# Patient Record
Sex: FEMALE | Race: WHITE | NOT HISPANIC OR LATINO | URBAN - METROPOLITAN AREA
[De-identification: names, ages, dates, MRNs, and addresses within clinical notes are randomized per-mention and may not be internally consistent; named-entity substitution may affect disease eponyms.]

---
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
## 2020-02-22 ENCOUNTER — EMERGENCY (EMERGENCY)
Facility: HOSPITAL | Age: 20
LOS: 1 days | Discharge: HOME | End: 2020-02-22
Attending: EMERGENCY MEDICINE
Payer: MEDICAID

## 2020-02-22 VITALS
RESPIRATION RATE: 18 BRPM | TEMPERATURE: 98 F | WEIGHT: 205.03 LBS | HEART RATE: 130 BPM | DIASTOLIC BLOOD PRESSURE: 98 MMHG | HEIGHT: 66 IN | OXYGEN SATURATION: 98 % | SYSTOLIC BLOOD PRESSURE: 154 MMHG

## 2020-02-22 VITALS
TEMPERATURE: 99 F | RESPIRATION RATE: 20 BRPM | DIASTOLIC BLOOD PRESSURE: 59 MMHG | OXYGEN SATURATION: 98 % | HEART RATE: 113 BPM | SYSTOLIC BLOOD PRESSURE: 123 MMHG

## 2020-02-22 LAB
ALBUMIN SERPL ELPH-MCNC: 3.2 G/DL — LOW (ref 3.3–5)
ALP SERPL-CCNC: 258 U/L — HIGH (ref 40–120)
ALT FLD-CCNC: 71 U/L — HIGH (ref 10–45)
ANION GAP SERPL CALC-SCNC: 11 MMOL/L — SIGNIFICANT CHANGE UP (ref 5–17)
AST SERPL-CCNC: 128 U/L — HIGH (ref 10–40)
BASOPHILS # BLD AUTO: 0 K/UL — SIGNIFICANT CHANGE UP (ref 0–0.2)
BASOPHILS NFR BLD AUTO: 0 % — SIGNIFICANT CHANGE UP (ref 0–2)
BILIRUB SERPL-MCNC: 0.4 MG/DL — SIGNIFICANT CHANGE UP (ref 0.2–1.2)
BUN SERPL-MCNC: 10 MG/DL — SIGNIFICANT CHANGE UP (ref 7–23)
CALCIUM SERPL-MCNC: 8.6 MG/DL — SIGNIFICANT CHANGE UP (ref 8.4–10.5)
CHLORIDE SERPL-SCNC: 99 MMOL/L — SIGNIFICANT CHANGE UP (ref 96–108)
CO2 SERPL-SCNC: 21 MMOL/L — LOW (ref 22–31)
CREAT SERPL-MCNC: 0.39 MG/DL — LOW (ref 0.5–1.3)
EOSINOPHIL # BLD AUTO: 0 K/UL — SIGNIFICANT CHANGE UP (ref 0–0.5)
EOSINOPHIL NFR BLD AUTO: 0 % — SIGNIFICANT CHANGE UP (ref 0–6)
GLUCOSE SERPL-MCNC: 107 MG/DL — HIGH (ref 70–99)
HCT VFR BLD CALC: 41.8 % — SIGNIFICANT CHANGE UP (ref 34.5–45)
HGB BLD-MCNC: 12.5 G/DL — SIGNIFICANT CHANGE UP (ref 11.5–15.5)
LYMPHOCYTES # BLD AUTO: 3.53 K/UL — HIGH (ref 1–3.3)
LYMPHOCYTES # BLD AUTO: 50 % — HIGH (ref 13–44)
MCHC RBC-ENTMCNC: 21.6 PG — LOW (ref 27–34)
MCHC RBC-ENTMCNC: 29.9 GM/DL — LOW (ref 32–36)
MCV RBC AUTO: 72.1 FL — LOW (ref 80–100)
MONOCYTES # BLD AUTO: 0.78 K/UL — SIGNIFICANT CHANGE UP (ref 0–0.9)
MONOCYTES NFR BLD AUTO: 11 % — SIGNIFICANT CHANGE UP (ref 2–14)
NEUTROPHILS # BLD AUTO: 1.62 K/UL — LOW (ref 1.8–7.4)
NEUTROPHILS NFR BLD AUTO: 23 % — LOW (ref 43–77)
PLATELET # BLD AUTO: 167 K/UL — SIGNIFICANT CHANGE UP (ref 150–400)
POTASSIUM SERPL-MCNC: 4.7 MMOL/L — SIGNIFICANT CHANGE UP (ref 3.5–5.3)
POTASSIUM SERPL-SCNC: 4.7 MMOL/L — SIGNIFICANT CHANGE UP (ref 3.5–5.3)
PROT SERPL-MCNC: 7.4 G/DL — SIGNIFICANT CHANGE UP (ref 6–8.3)
RBC # BLD: 5.8 M/UL — HIGH (ref 3.8–5.2)
RBC # FLD: 14.2 % — SIGNIFICANT CHANGE UP (ref 10.3–14.5)
S PYO AG SPEC QL IA: NEGATIVE — SIGNIFICANT CHANGE UP
SODIUM SERPL-SCNC: 131 MMOL/L — LOW (ref 135–145)
T4 AB SER-ACNC: 24.1 UG/DL — HIGH (ref 4.6–12)
TSH SERPL-MCNC: <0.01 UIU/ML — LOW (ref 0.27–4.2)
WBC # BLD: 7.05 K/UL — SIGNIFICANT CHANGE UP (ref 3.8–10.5)
WBC # FLD AUTO: 7.05 K/UL — SIGNIFICANT CHANGE UP (ref 3.8–10.5)

## 2020-02-22 PROCEDURE — 99284 EMERGENCY DEPT VISIT MOD MDM: CPT | Mod: 25

## 2020-02-22 PROCEDURE — 84436 ASSAY OF TOTAL THYROXINE: CPT

## 2020-02-22 PROCEDURE — 99284 EMERGENCY DEPT VISIT MOD MDM: CPT

## 2020-02-22 PROCEDURE — 87880 STREP A ASSAY W/OPTIC: CPT

## 2020-02-22 PROCEDURE — 96374 THER/PROPH/DIAG INJ IV PUSH: CPT

## 2020-02-22 PROCEDURE — 80053 COMPREHEN METABOLIC PANEL: CPT

## 2020-02-22 PROCEDURE — 93005 ELECTROCARDIOGRAM TRACING: CPT

## 2020-02-22 PROCEDURE — 93010 ELECTROCARDIOGRAM REPORT: CPT

## 2020-02-22 PROCEDURE — 85060 BLOOD SMEAR INTERPRETATION: CPT

## 2020-02-22 PROCEDURE — 87081 CULTURE SCREEN ONLY: CPT

## 2020-02-22 PROCEDURE — 96372 THER/PROPH/DIAG INJ SC/IM: CPT | Mod: XU

## 2020-02-22 PROCEDURE — 85027 COMPLETE CBC AUTOMATED: CPT

## 2020-02-22 PROCEDURE — 84443 ASSAY THYROID STIM HORMONE: CPT

## 2020-02-22 RX ORDER — DEXAMETHASONE 0.5 MG/5ML
10 ELIXIR ORAL ONCE
Refills: 0 | Status: COMPLETED | OUTPATIENT
Start: 2020-02-22 | End: 2020-02-22

## 2020-02-22 RX ORDER — SODIUM CHLORIDE 9 MG/ML
1000 INJECTION INTRAMUSCULAR; INTRAVENOUS; SUBCUTANEOUS ONCE
Refills: 0 | Status: COMPLETED | OUTPATIENT
Start: 2020-02-22 | End: 2020-02-22

## 2020-02-22 RX ORDER — KETOROLAC TROMETHAMINE 30 MG/ML
15 SYRINGE (ML) INJECTION ONCE
Refills: 0 | Status: DISCONTINUED | OUTPATIENT
Start: 2020-02-22 | End: 2020-02-22

## 2020-02-22 RX ORDER — BENZOCAINE AND MENTHOL 5; 1 G/100ML; G/100ML
1 LIQUID ORAL ONCE
Refills: 0 | Status: COMPLETED | OUTPATIENT
Start: 2020-02-22 | End: 2020-02-22

## 2020-02-22 RX ADMIN — Medication 15 MILLIGRAM(S): at 04:33

## 2020-02-22 RX ADMIN — BENZOCAINE AND MENTHOL 1 LOZENGE: 5; 1 LIQUID ORAL at 03:20

## 2020-02-22 RX ADMIN — Medication 10 MILLIGRAM(S): at 03:21

## 2020-02-22 RX ADMIN — Medication 15 MILLIGRAM(S): at 03:21

## 2020-02-22 RX ADMIN — SODIUM CHLORIDE 1000 MILLILITER(S): 9 INJECTION INTRAMUSCULAR; INTRAVENOUS; SUBCUTANEOUS at 03:21

## 2020-02-22 NOTE — ED PROVIDER NOTE - OBJECTIVE STATEMENT
20 year-old female with history of hyperthyroidism (on methimazole), HTN, anemia (on Fe pills) presents to the Emergency Department for URI symptoms.  Reports cough (occasionally productive), nasal congestion and odynophagia ongoing for the past 5 days after positive sick contact (boyfriend).  Subjective fever.  Father had some left-over amoxicillin and give 3 days of 500mg Amoxicillin QD.  No nausea, vomiting, chest pain, shortness of breath, abdominal pain.  + diarrhea; history of IBS.  No BIS.  Patient reports baseline HR is in the 120s.

## 2020-02-22 NOTE — ED PROVIDER NOTE - ATTENDING CONTRIBUTION TO CARE
Leilani Baer MD - Attending Physician: I have personally seen and examined this patient with the resident/fellow.  I have fully participated in the care of this patient. I have reviewed all pertinent clinical information, including history, physical exam, plan and the Resident/Fellow’s note and agree except as noted. See MDM

## 2020-02-22 NOTE — ED PROVIDER NOTE - CLINICAL SUMMARY MEDICAL DECISION MAKING FREE TEXT BOX
20 year-old female with history of hyperthyroidism (on methimazole), HTN, anemia (on Fe pills) presents to the Emergency Department for URI symptoms; no fever.  Tachycardia likely baseline however; CBC to rule-out anemia.  Labs, IVF, pain control and likely d/c home. 20 year-old female with history of hyperthyroidism (on methimazole), HTN, anemia (on Fe pills) presents to the Emergency Department for URI symptoms; no fever.  Tachycardia likely baseline however; CBC to rule-out anemia.  Labs, IVF, pain control and likely d/c home.    Leilani Baer MD - Attending Physician: Pt here with URI symptoms x 5 days consistent with viral syndrome. Known baseline tachycardia. Well appearing, nonfocal exam. Tylenol/motrin, PO chall for dc

## 2020-02-22 NOTE — ED PROVIDER NOTE - PATIENT PORTAL LINK FT
You can access the FollowMyHealth Patient Portal offered by [facility] by registering at the following website: [email or web address]. By joining [facility]’s FollowMyHealth portal, you will also be able to view your health information using other applications (apps) compatible with our system.

## 2020-02-22 NOTE — ED PROVIDER NOTE - NSFOLLOWUPINSTRUCTIONS_ED_ALL_ED_FT
Follow-up with your Primary Care Physician within 24-48 hours.  Please return to the Emergency Department immediately for any new, worsening or concerning symptoms.  Copy of your lab work, imaging and/or other testing performed in the ER is attached along with your discharge paperwork.  Please take this to your Primary Care Physician for further discussion, evaluation and comparison with your prior blood work results.     Can use Tylenol (up to 1000mg every 6 hours) or Ibuprofen (up to 600mg every 6 hours) as per package directions for pain - use only as needed.  These are over-the-counter medications - please respect the warnings on the label.     Please follow-up with Gastroenterology for elevation in your liver enzymes. Follow-up with your Primary Care Physician within 24-48 hours.  Please return to the Emergency Department immediately for any new, worsening or concerning symptoms.  Copy of your lab work, imaging and/or other testing performed in the ER is attached along with your discharge paperwork.  Please take this to your Primary Care Physician for further discussion, evaluation and comparison with your prior blood work results.     Can use Tylenol (up to 1000mg every 6 hours) or Ibuprofen (up to 600mg every 6 hours) as per package directions for pain - use only as needed.  These are over-the-counter medications - please respect the warnings on the label.     You likely have Mono. Avoid all contact sports for 6 weeks. Please see your doctor for repeat labs to ensure the Liver enzymes and WBC improve once you are well.

## 2020-02-22 NOTE — ED PROVIDER NOTE - CARE PLAN
Principal Discharge DX:	Sore throat  Secondary Diagnosis:	URI, acute Principal Discharge DX:	Mononucleosis syndrome  Secondary Diagnosis:	URI, acute  Secondary Diagnosis:	Sore throat

## 2020-02-22 NOTE — ED PROVIDER NOTE - PROGRESS NOTE DETAILS
Marlon MONTANO: Patient's VS improved; labs w/o acute pathology.  Discussed transaminitis - gave GI follow-up. Marlon MONTANO: Patient's VS improved; labs w/o acute pathology.  Discussed transaminitis - gave GI follow-up.  Patient with likely mono; advised for no sports for 6 weeks. Leilani Baer MD - Attending Physician: Labs most consistent with likely Mono (pharyngitis without Strep, atypical reactive lymphs, mild transaminitis). Avoid contact sports. May have prolonged URI symptoms. Supportive care. F/u with PMD

## 2020-02-22 NOTE — ED PROVIDER NOTE - NSFOLLOWUPCLINICS_GEN_ALL_ED_FT
[facility]  Gastroenterology  [address] [place] [address]  Phone: [phone]  Fax:   Follow Up Time: 4-6 Days

## 2020-02-22 NOTE — ED ADULT NURSE NOTE - OBJECTIVE STATEMENT
20 year old female presents to ED via walk-in complaining of throat pain and cold exposure. PMH hyperthyroidism, HTN. Endorses boyfriend has bronchitis. Upon assessment A&O x4, ambulatory with steady gait and well appearing. Complains of throat pain x1 week, difficulty swallowing, and decreased PO intake. Airway patent. HR in 140s in main ED, placed on bedside cardiac monitor. Peripheral IV placed. Denies chest pain, SOB, n/v/d, fever and chills at this time. Bed locked and in lowest position. Family at bedside. Comfort and safety measures maintained. no

## 2020-02-22 NOTE — ED PROVIDER NOTE - PHYSICAL EXAMINATION
*Gen: NAD, AAO*3  *HEENT: NC/AT, dry mucous membranes, 2+ tonsils with exudate, airway patent, trachea midline  *CV: RRR, S1/S2 present, no murmurs  *Resp: no respiratory distress, LCTAB, no wheezing  *Abd: non-distended, soft N/Tx4, no guarding or rigidity  *Neuro: no focal neuro deficits, moving all limbs appropriately  *Extremities: no gross deformity  *Skin: no rashes, no wounds   ~ Maryam Mason M.D. *Gen: NAD, AAO*3  *HEENT: NC/AT, moist mucous membranes, 2+ tonsils with exudate, airway patent, trachea midline  *CV: Tachycardic, Reg Rhythm, S1/S2 present, no murmurs  *Resp: no respiratory distress, LCTAB, no wheezing  *Abd: non-distended, soft N/Tx4, no guarding or rigidity  *Neuro: no focal neuro deficits, moving all limbs appropriately  *Extremities: no gross deformity  *Skin: no rashes, no wounds   ~ Maryam Mason M.D.

## 2020-02-23 NOTE — ED POST DISCHARGE NOTE - DETAILS
known hyperthyroid,. lvm to call back admin line - Adelina Lindsay PA-C lvm to call back admin line. - Adelina Lindsay PA-C Third attempt. If no call back today will send letter. - Jamila Kelley PA-C Third attempt. No answer on pt number and VM full on alternate number. Will send letter. - Jamila Kelley PA-C

## 2020-03-11 ENCOUNTER — EMERGENCY (EMERGENCY)
Facility: HOSPITAL | Age: 20
LOS: 1 days | Discharge: HOME | End: 2020-03-11
Attending: EMERGENCY MEDICINE | Admitting: EMERGENCY MEDICINE
Payer: MEDICAID

## 2020-03-11 PROCEDURE — 99283 EMERGENCY DEPT VISIT LOW MDM: CPT

## 2020-03-12 VITALS
RESPIRATION RATE: 16 BRPM | OXYGEN SATURATION: 100 % | TEMPERATURE: 98 F | SYSTOLIC BLOOD PRESSURE: 150 MMHG | DIASTOLIC BLOOD PRESSURE: 79 MMHG | HEART RATE: 106 BPM

## 2020-03-12 VITALS
HEART RATE: 100 BPM | TEMPERATURE: 98 F | RESPIRATION RATE: 18 BRPM | OXYGEN SATURATION: 100 % | DIASTOLIC BLOOD PRESSURE: 71 MMHG | SYSTOLIC BLOOD PRESSURE: 138 MMHG

## 2020-03-12 DIAGNOSIS — R46.89 OTHER SYMPTOMS AND SIGNS INVOLVING APPEARANCE AND BEHAVIOR: ICD-10-CM

## 2020-03-12 DIAGNOSIS — F43.10 POST-TRAUMATIC STRESS DISORDER, UNSPECIFIED: ICD-10-CM

## 2020-03-12 DIAGNOSIS — F33.1 MAJOR DEPRESSIVE DISORDER, RECURRENT, MODERATE: ICD-10-CM

## 2020-03-12 LAB
ALBUMIN SERPL ELPH-MCNC: 3.8 G/DL — SIGNIFICANT CHANGE UP (ref 3.3–5)
ALP SERPL-CCNC: 130 U/L — HIGH (ref 40–120)
ALT FLD-CCNC: 20 U/L — SIGNIFICANT CHANGE UP (ref 4–33)
ANION GAP SERPL CALC-SCNC: 15 MMO/L — HIGH (ref 7–14)
APAP SERPL-MCNC: < 15 UG/ML — LOW (ref 15–25)
AST SERPL-CCNC: 25 U/L — SIGNIFICANT CHANGE UP (ref 4–32)
BASOPHILS # BLD AUTO: 0.02 K/UL — SIGNIFICANT CHANGE UP (ref 0–0.2)
BASOPHILS NFR BLD AUTO: 0.5 % — SIGNIFICANT CHANGE UP (ref 0–2)
BILIRUB SERPL-MCNC: 0.4 MG/DL — SIGNIFICANT CHANGE UP (ref 0.2–1.2)
BUN SERPL-MCNC: 9 MG/DL — SIGNIFICANT CHANGE UP (ref 7–23)
CALCIUM SERPL-MCNC: 9.1 MG/DL — SIGNIFICANT CHANGE UP (ref 8.4–10.5)
CHLORIDE SERPL-SCNC: 103 MMOL/L — SIGNIFICANT CHANGE UP (ref 98–107)
CO2 SERPL-SCNC: 19 MMOL/L — LOW (ref 22–31)
CREAT SERPL-MCNC: 0.46 MG/DL — LOW (ref 0.5–1.3)
EOSINOPHIL # BLD AUTO: 0.07 K/UL — SIGNIFICANT CHANGE UP (ref 0–0.5)
EOSINOPHIL NFR BLD AUTO: 1.7 % — SIGNIFICANT CHANGE UP (ref 0–6)
ETHANOL BLD-MCNC: < 10 MG/DL — SIGNIFICANT CHANGE UP
GLUCOSE SERPL-MCNC: 88 MG/DL — SIGNIFICANT CHANGE UP (ref 70–99)
HCG SERPL-ACNC: < 5 MIU/ML — SIGNIFICANT CHANGE UP
HCT VFR BLD CALC: 38.3 % — SIGNIFICANT CHANGE UP (ref 34.5–45)
HGB BLD-MCNC: 11.5 G/DL — SIGNIFICANT CHANGE UP (ref 11.5–15.5)
IMM GRANULOCYTES NFR BLD AUTO: 0.2 % — SIGNIFICANT CHANGE UP (ref 0–1.5)
LYMPHOCYTES # BLD AUTO: 2.31 K/UL — SIGNIFICANT CHANGE UP (ref 1–3.3)
LYMPHOCYTES # BLD AUTO: 56.6 % — HIGH (ref 13–44)
MCHC RBC-ENTMCNC: 22.2 PG — LOW (ref 27–34)
MCHC RBC-ENTMCNC: 30 % — LOW (ref 32–36)
MCV RBC AUTO: 73.9 FL — LOW (ref 80–100)
MONOCYTES # BLD AUTO: 0.54 K/UL — SIGNIFICANT CHANGE UP (ref 0–0.9)
MONOCYTES NFR BLD AUTO: 13.2 % — SIGNIFICANT CHANGE UP (ref 2–14)
NEUTROPHILS # BLD AUTO: 1.13 K/UL — LOW (ref 1.8–7.4)
NEUTROPHILS NFR BLD AUTO: 27.8 % — LOW (ref 43–77)
NRBC # FLD: 0 K/UL — SIGNIFICANT CHANGE UP (ref 0–0)
PLATELET # BLD AUTO: 211 K/UL — SIGNIFICANT CHANGE UP (ref 150–400)
PMV BLD: 12.2 FL — SIGNIFICANT CHANGE UP (ref 7–13)
POTASSIUM SERPL-MCNC: 4.2 MMOL/L — SIGNIFICANT CHANGE UP (ref 3.5–5.3)
POTASSIUM SERPL-SCNC: 4.2 MMOL/L — SIGNIFICANT CHANGE UP (ref 3.5–5.3)
PROT SERPL-MCNC: 7.1 G/DL — SIGNIFICANT CHANGE UP (ref 6–8.3)
RBC # BLD: 5.18 M/UL — SIGNIFICANT CHANGE UP (ref 3.8–5.2)
RBC # FLD: 15 % — HIGH (ref 10.3–14.5)
SALICYLATES SERPL-MCNC: < 5 MG/DL — LOW (ref 15–30)
SODIUM SERPL-SCNC: 137 MMOL/L — SIGNIFICANT CHANGE UP (ref 135–145)
T3 SERPL-MCNC: 651 NG/DL — HIGH (ref 80–200)
T4 AB SER-ACNC: 24.86 UG/DL — HIGH (ref 5.1–13)
TSH SERPL-MCNC: < 0.1 UIU/ML — LOW (ref 0.27–4.2)
WBC # BLD: 4.08 K/UL — SIGNIFICANT CHANGE UP (ref 3.8–10.5)
WBC # FLD AUTO: 4.08 K/UL — SIGNIFICANT CHANGE UP (ref 3.8–10.5)

## 2020-03-12 PROCEDURE — 90792 PSYCH DIAG EVAL W/MED SRVCS: CPT | Mod: GC

## 2020-03-12 NOTE — ED BEHAVIORAL HEALTH ASSESSMENT NOTE - DIFFERENTIAL
Major depressive Episode vs Adjustment disorder with disturbance of mood/conduct  Acute on chronic worsening of SI
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
## 2020-03-12 NOTE — ED ADULT NURSE NOTE - OBJECTIVE STATEMENT
I am having suicidal thoughts, I have passive thought everyday but today plans have started. I felt like I may act on them so I told my dad" patient follows up with [facility] outpatient. Reports noncompliance with medications and currently not in therapy, states "I have been on a waitlist for 5 months for a therapist". Pt is calm and cooperative. .  pt endorsing anxiety at this time. Denies AVH, paranoia. Endorses smoking MJ.

## 2020-03-12 NOTE — ED BEHAVIORAL HEALTH ASSESSMENT NOTE - SUICIDE RISK FACTORS
Mood Disorder current/past/Cluster B Personality disorders or traits current/past/Hopelessness or despair/PTSD current/past
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
## 2020-03-12 NOTE — ED BEHAVIORAL HEALTH NOTE - BEHAVIORAL HEALTH NOTE
ROLA called pt's father, Kanu, at [phone] to obtain collateral information.  There was no answer and voice mail was full; unable to leave a message at this time.  No family in waiting room at this time.
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
## 2020-03-12 NOTE — ED PROVIDER NOTE - OBJECTIVE STATEMENT
21 yo F with history of hyperthryroid on methizmazole 15 mg, HTN (atenolol 50 mg), depression but not taking her previous meds (lexapro 15 mg, risperdone dosage unknown, abilify 10 mg, prazosin 2 mg) that is being brought in tonight by father Ashutosh @ [phone] for suicidal ideation. pt reports that she has baseline depression and constant thoughts of suicide but something today made her consider suicide more with plans of hanging herself vs walking on to train tracks near her house. Pt has tried cutting herself in past. 5 months ago she was discharged from [facility] in psych after 5month stay as she finished a program but they released her and currently her dad is trying to get her back in to that program. Pt denies any recent illnesses otherwise. Non smoker, no drinking, no drugs. LMP 3 wks ago. No vaginal bleeding/discharge. 21 yo F with history of hyperthryroid on methizmazole 15 mg, HTN (atenolol 50 mg), depression but not taking her previous meds (lexapro 15 mg, risperdone dosage unknown, abilify 10 mg, prazosin 2 mg) that is being brought in tonight by father Ashutosh @ [phone] for suicidal ideation. pt reports that she has baseline depression and constant thoughts of suicide but something today made her consider suicide more with plans of hanging herself vs walking on to train tracks near her house. Pt has tried cutting herself in past. 5 months ago she was discharged from [facility] in psych after 5month stay as she finished a program but they released her and currently her dad is trying to get her back in to that program. Pt denies any recent illnesses otherwise and is not homicidal or having any hallucinations. Non smoker, no drinking, no drugs. LMP 3 wks ago. No vaginal bleeding/discharge.

## 2020-03-12 NOTE — ED ADULT TRIAGE NOTE - CHIEF COMPLAINT QUOTE
" I am having suicidal thoughts, I have passive thought everyday but today plans have started." pt has [facility] admissions prior for suicidal ideation, patient follows up with [facility] outpatient.  pt endorsing anxiety at this time. MD Krueger called for  clearance. " I am having suicidal thoughts, I have passive thought everyday but today plans have started. I felt like I may act on them so I told my dad" pt has [facility] admissions prior for suicidal ideation, patient follows up with [facility] outpatient.  pt endorsing anxiety at this time. MD Krueger called for  clearance.

## 2020-03-12 NOTE — ED PROVIDER NOTE - PROGRESS NOTE DETAILS
Pt labs shows hyperthryoidism which is already known and she is already on methimazole and atenolol. Has an endocrinologist that she follows with. Levels of T3 and T4 shows probably not adequately controlled but no S&S of thyroid storm or hyperthyroid state. Pending Psych clearance, will continue to monitor pending psych recs. MD Gardiner:  medically cleared by [facility] on prior shift.  Pending collateral from family.  Cleared by Psych.  Father coming to pick her up.

## 2020-03-12 NOTE — ED BEHAVIORAL HEALTH ASSESSMENT NOTE - MEDICATIONS (PRESCRIPTIONS, DIRECTIONS)
Continue to hold her home medications and patient will revisit starting them at her upcoming appointment

## 2020-03-12 NOTE — ED BEHAVIORAL HEALTH NOTE - BEHAVIORAL HEALTH NOTE
Writer called pt’s father Kanu  voicemail box is full and cannot leave a message.  Writer called pt’s phone  in case her father had her phone, but also goes to [employer].

## 2020-03-12 NOTE — ED BEHAVIORAL HEALTH ASSESSMENT NOTE - RISK ASSESSMENT
Patient represents a low-moderate acute suicide risk with numerous protective factors including ability to safety plan, no access to firearms, social supports, positive therapeutic relationships, future-oriented, goal-oriented, no substance use. Risk factors including recent SI with plan, hx of SA, hx of psych admissions, her underlying psychiatric symptoms, interpersonal stressors, impulsivity.     She remains elevated chronic risk given her history, but at this time is safe to discharge home (pending further discussion with her father) given upcoming psychiatric appointment and ability to complete Patient Safety Plan . Moderate Acute Suicide Risk

## 2020-03-12 NOTE — ED PROVIDER NOTE - CLINICAL SUMMARY MEDICAL DECISION MAKING FREE TEXT BOX
19 yo F with depression, hyperthyroid that is not taking her meds for depression that was previously hospitalized for SI that is being brought in tonight by father Ashutosh for SI. She wants to walk in front of train vs hang herself. Benign physical exam. Will place in psych/BH for eval. possible voluntary admission for SI. Denies HI/hallucinations. Will obtain labs to check on her thyroid.

## 2020-03-12 NOTE — ED PROVIDER NOTE - PATIENT PORTAL LINK FT
You can access the FollowMyHealth Patient Portal offered by [facility] by registering at the following website: [email or web address]. By joining [facility]’s FollowMyHealth portal, you will also be able to view your health information using other applications (apps) compatible with our system.

## 2020-03-12 NOTE — ED BEHAVIORAL HEALTH ASSESSMENT NOTE - ACTIVATING EVENTS/STRESSORS
Change in provider or treatment (i.e., medications, psychotherapy, milieu)/Non-compliant or not receiving treatment/Triggering events leading to humiliation, shame, and/or despair (e.g. Loss of relationship, financial or health status) (real or anticipated)

## 2020-03-12 NOTE — ED ADULT NURSE NOTE - CHIEF COMPLAINT QUOTE
" I am having suicidal thoughts, I have passive thought everyday but today plans have started. I felt like I may act on them so I told my dad" pt has [facility] admissions prior for suicidal ideation, patient follows up with [facility] outpatient.  pt endorsing anxiety at this time. MD Krueger called for  clearance.
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
## 2020-03-12 NOTE — ED BEHAVIORAL HEALTH ASSESSMENT NOTE - OTHER PAST PSYCHIATRIC HISTORY (INCLUDE DETAILS REGARDING ONSET, COURSE OF ILLNESS, INPATIENT/OUTPATIENT TREATMENT)
Hx of 3 past inpatient psychiatric hospitalization, hx of suicide attempt in 2012 by attempt to hang self (pt used belt and tied to shower curtain chikis; placed around neck for 1 minute but then stopped); denies hx of cutting or other self-injurious behavior.  Patient first started seeing a therapist around Sept. 2011 and stopped in 2013.  She first saw a psychiatrist in Sept. 2012 and stopped in Sept. 2015 because she thought she was feeling better and no longer needed Lexapro.  Patient first started medication (Zoloft) in Oct. 2012. Discharged from [facility] in Nov 2019. Currently sees Dr. Myles, has stopped her home psychiatric medications since January

## 2020-03-12 NOTE — ED BEHAVIORAL HEALTH NOTE - BEHAVIORAL HEALTH NOTE
Writer spoke to pt's father Kanu  briefly he stated he brought patient to the emergency room because of an ongoing situation with [facility].  He states she was in treatment at [facility] for 5 months and then they did not transition her to a therapist as promised.  She does have a psychiatrist, but not a therapist.  He states patient requested to go to the emergency room to talk to someone.  He states she told her, "I need to go to the hospital because I'm not having right thoughts".  He states she has a follow up appointment with the psychiatrist at [facility].  Pt's father doesn't have any safety concerns with patient returning home. Writer spoke to pt's father Kanu  briefly he stated he brought patient to the emergency room because of an ongoing situation with [facility].  He states she was in treatment at [facility] for 5 months and then they did not transition her to a therapist as promised.  She does have a psychiatrist, but not a therapist.  He states patient requested to go to the emergency room to talk to someone.  He states she told her, "I need to go to the hospital because I'm not having right thoughts".  He states she has a follow up appointment with the psychiatrist at [facility].  Pt's father doesn't have any safety concerns with patient returning home.  He will transport pt home.

## 2020-03-12 NOTE — ED BEHAVIORAL HEALTH ASSESSMENT NOTE - VIOLENCE PROTECTIVE FACTORS:
Residential stability/Insight into violence risk and need for management/treatment/Relationship stability/Sobriety/Engagement in treatment

## 2020-03-13 NOTE — ED BEHAVIORAL HEALTH NOTE - BEHAVIORAL HEALTH NOTE
High Risk Log follow up:  spoke to patient  states she is doing well and planning to see her psychiatrist today at 10:30am.

## 2021-09-23 ENCOUNTER — INPATIENT (INPATIENT)
Facility: HOSPITAL | Age: 21
LOS: 4 days | Discharge: PSYCHIATRIC OR SUBSTANCE USE FACILITY | End: 2021-09-28
Attending: HOSPITALIST | Admitting: HOSPITALIST
Payer: MEDICAID

## 2021-09-23 VITALS
DIASTOLIC BLOOD PRESSURE: 93 MMHG | TEMPERATURE: 99 F | SYSTOLIC BLOOD PRESSURE: 154 MMHG | OXYGEN SATURATION: 100 % | HEIGHT: 66 IN | RESPIRATION RATE: 18 BRPM | HEART RATE: 145 BPM

## 2021-09-23 DIAGNOSIS — F32.9 MAJOR DEPRESSIVE DISORDER, SINGLE EPISODE, UNSPECIFIED: ICD-10-CM

## 2021-09-23 DIAGNOSIS — R01.1 CARDIAC MURMUR, UNSPECIFIED: ICD-10-CM

## 2021-09-23 DIAGNOSIS — F43.10 POST-TRAUMATIC STRESS DISORDER, UNSPECIFIED: ICD-10-CM

## 2021-09-23 DIAGNOSIS — T42.6X1A POISONING BY OTHER ANTIEPILEPTIC AND SEDATIVE-HYPNOTIC DRUGS, ACCIDENTAL (UNINTENTIONAL), INITIAL ENCOUNTER: ICD-10-CM

## 2021-09-23 DIAGNOSIS — T42.6X2A POISONING BY OTHER ANTIEPILEPTIC AND SEDATIVE-HYPNOTIC DRUGS, INTENTIONAL SELF-HARM, INITIAL ENCOUNTER: ICD-10-CM

## 2021-09-23 DIAGNOSIS — F39 UNSPECIFIED MOOD [AFFECTIVE] DISORDER: ICD-10-CM

## 2021-09-23 DIAGNOSIS — E05.90 THYROTOXICOSIS, UNSPECIFIED WITHOUT THYROTOXIC CRISIS OR STORM: ICD-10-CM

## 2021-09-23 LAB
ALBUMIN SERPL ELPH-MCNC: 4 G/DL — SIGNIFICANT CHANGE UP (ref 3.3–5)
ALP SERPL-CCNC: 149 U/L — HIGH (ref 40–120)
ALT FLD-CCNC: 23 U/L — SIGNIFICANT CHANGE UP (ref 4–33)
ANION GAP SERPL CALC-SCNC: 15 MMOL/L — HIGH (ref 7–14)
APPEARANCE UR: CLEAR — SIGNIFICANT CHANGE UP
AST SERPL-CCNC: 68 U/L — HIGH (ref 4–32)
BACTERIA # UR AUTO: ABNORMAL
BASOPHILS # BLD AUTO: 0.06 K/UL — SIGNIFICANT CHANGE UP (ref 0–0.2)
BASOPHILS NFR BLD AUTO: 0.7 % — SIGNIFICANT CHANGE UP (ref 0–2)
BILIRUB SERPL-MCNC: 0.4 MG/DL — SIGNIFICANT CHANGE UP (ref 0.2–1.2)
BILIRUB UR-MCNC: NEGATIVE — SIGNIFICANT CHANGE UP
BLOOD GAS VENOUS COMPREHENSIVE RESULT: SIGNIFICANT CHANGE UP
BUN SERPL-MCNC: 10 MG/DL — SIGNIFICANT CHANGE UP (ref 7–23)
CALCIUM SERPL-MCNC: 9.6 MG/DL — SIGNIFICANT CHANGE UP (ref 8.4–10.5)
CHLORIDE SERPL-SCNC: 104 MMOL/L — SIGNIFICANT CHANGE UP (ref 98–107)
CO2 SERPL-SCNC: 18 MMOL/L — LOW (ref 22–31)
COLOR SPEC: YELLOW — SIGNIFICANT CHANGE UP
COVID-19 SPIKE DOMAIN AB INTERP: POSITIVE
COVID-19 SPIKE DOMAIN ANTIBODY RESULT: >250 U/ML — HIGH
CREAT SERPL-MCNC: 0.43 MG/DL — LOW (ref 0.5–1.3)
DIFF PNL FLD: ABNORMAL
EOSINOPHIL # BLD AUTO: 0.1 K/UL — SIGNIFICANT CHANGE UP (ref 0–0.5)
EOSINOPHIL NFR BLD AUTO: 1.2 % — SIGNIFICANT CHANGE UP (ref 0–6)
EPI CELLS # UR: 5 /HPF — SIGNIFICANT CHANGE UP (ref 0–5)
GLUCOSE SERPL-MCNC: 90 MG/DL — SIGNIFICANT CHANGE UP (ref 70–99)
GLUCOSE UR QL: NEGATIVE — SIGNIFICANT CHANGE UP
HCG SERPL-ACNC: <5 MIU/ML — SIGNIFICANT CHANGE UP
HCT VFR BLD CALC: 39.5 % — SIGNIFICANT CHANGE UP (ref 34.5–45)
HGB BLD-MCNC: 12.3 G/DL — SIGNIFICANT CHANGE UP (ref 11.5–15.5)
IANC: 5.22 K/UL — SIGNIFICANT CHANGE UP (ref 1.5–8.5)
IMM GRANULOCYTES NFR BLD AUTO: 0.2 % — SIGNIFICANT CHANGE UP (ref 0–1.5)
KETONES UR-MCNC: NEGATIVE — SIGNIFICANT CHANGE UP
LEUKOCYTE ESTERASE UR-ACNC: NEGATIVE — SIGNIFICANT CHANGE UP
LYMPHOCYTES # BLD AUTO: 2.38 K/UL — SIGNIFICANT CHANGE UP (ref 1–3.3)
LYMPHOCYTES # BLD AUTO: 28.1 % — SIGNIFICANT CHANGE UP (ref 13–44)
MAGNESIUM SERPL-MCNC: 1.5 MG/DL — LOW (ref 1.6–2.6)
MCHC RBC-ENTMCNC: 22.2 PG — LOW (ref 27–34)
MCHC RBC-ENTMCNC: 31.1 GM/DL — LOW (ref 32–36)
MCV RBC AUTO: 71.3 FL — LOW (ref 80–100)
MONOCYTES # BLD AUTO: 0.7 K/UL — SIGNIFICANT CHANGE UP (ref 0–0.9)
MONOCYTES NFR BLD AUTO: 8.3 % — SIGNIFICANT CHANGE UP (ref 2–14)
NEUTROPHILS # BLD AUTO: 5.22 K/UL — SIGNIFICANT CHANGE UP (ref 1.8–7.4)
NEUTROPHILS NFR BLD AUTO: 61.5 % — SIGNIFICANT CHANGE UP (ref 43–77)
NITRITE UR-MCNC: NEGATIVE — SIGNIFICANT CHANGE UP
NRBC # BLD: 0 /100 WBCS — SIGNIFICANT CHANGE UP
NRBC # FLD: 0 K/UL — SIGNIFICANT CHANGE UP
PCP SPEC-MCNC: SIGNIFICANT CHANGE UP
PH UR: 6 — SIGNIFICANT CHANGE UP (ref 5–8)
PHOSPHATE SERPL-MCNC: 3.2 MG/DL — SIGNIFICANT CHANGE UP (ref 2.5–4.5)
PLATELET # BLD AUTO: 155 K/UL — SIGNIFICANT CHANGE UP (ref 150–400)
POTASSIUM SERPL-MCNC: 4.3 MMOL/L — SIGNIFICANT CHANGE UP (ref 3.5–5.3)
POTASSIUM SERPL-SCNC: 4.3 MMOL/L — SIGNIFICANT CHANGE UP (ref 3.5–5.3)
PROT SERPL-MCNC: 7.5 G/DL — SIGNIFICANT CHANGE UP (ref 6–8.3)
PROT UR-MCNC: ABNORMAL
RBC # BLD: 5.54 M/UL — HIGH (ref 3.8–5.2)
RBC # FLD: 13.8 % — SIGNIFICANT CHANGE UP (ref 10.3–14.5)
RBC CASTS # UR COMP ASSIST: 4 /HPF — SIGNIFICANT CHANGE UP (ref 0–4)
SARS-COV-2 IGG+IGM SERPL QL IA: >250 U/ML — HIGH
SARS-COV-2 IGG+IGM SERPL QL IA: POSITIVE
SARS-COV-2 RNA SPEC QL NAA+PROBE: SIGNIFICANT CHANGE UP
SODIUM SERPL-SCNC: 137 MMOL/L — SIGNIFICANT CHANGE UP (ref 135–145)
SP GR SPEC: 1.03 — SIGNIFICANT CHANGE UP (ref 1–1.05)
TOXICOLOGY SCREEN, DRUGS OF ABUSE, SERUM RESULT: SIGNIFICANT CHANGE UP
TSH SERPL-MCNC: <0.1 UIU/ML — LOW (ref 0.27–4.2)
UROBILINOGEN FLD QL: SIGNIFICANT CHANGE UP
WBC # BLD: 8.48 K/UL — SIGNIFICANT CHANGE UP (ref 3.8–10.5)
WBC # FLD AUTO: 8.48 K/UL — SIGNIFICANT CHANGE UP (ref 3.8–10.5)
WBC UR QL: 3 /HPF — SIGNIFICANT CHANGE UP (ref 0–5)

## 2021-09-23 PROCEDURE — 99291 CRITICAL CARE FIRST HOUR: CPT

## 2021-09-23 PROCEDURE — 99223 1ST HOSP IP/OBS HIGH 75: CPT | Mod: GC

## 2021-09-23 RX ORDER — LANOLIN ALCOHOL/MO/W.PET/CERES
3 CREAM (GRAM) TOPICAL AT BEDTIME
Refills: 0 | Status: DISCONTINUED | OUTPATIENT
Start: 2021-09-23 | End: 2021-09-25

## 2021-09-23 RX ORDER — MAGNESIUM SULFATE 500 MG/ML
2 VIAL (ML) INJECTION ONCE
Refills: 0 | Status: COMPLETED | OUTPATIENT
Start: 2021-09-23 | End: 2021-09-23

## 2021-09-23 RX ORDER — ACETAMINOPHEN 500 MG
650 TABLET ORAL EVERY 6 HOURS
Refills: 0 | Status: DISCONTINUED | OUTPATIENT
Start: 2021-09-23 | End: 2021-09-28

## 2021-09-23 RX ORDER — INFLUENZA VIRUS VACCINE 15; 15; 15; 15 UG/.5ML; UG/.5ML; UG/.5ML; UG/.5ML
0.5 SUSPENSION INTRAMUSCULAR ONCE
Refills: 0 | Status: DISCONTINUED | OUTPATIENT
Start: 2021-09-23 | End: 2021-09-28

## 2021-09-23 RX ORDER — HYDROCORTISONE 20 MG
100 TABLET ORAL THREE TIMES A DAY
Refills: 0 | Status: DISCONTINUED | OUTPATIENT
Start: 2021-09-23 | End: 2021-09-23

## 2021-09-23 RX ORDER — NICOTINE POLACRILEX 2 MG
1 GUM BUCCAL DAILY
Refills: 0 | Status: DISCONTINUED | OUTPATIENT
Start: 2021-09-23 | End: 2021-09-28

## 2021-09-23 RX ORDER — ONDANSETRON 8 MG/1
4 TABLET, FILM COATED ORAL EVERY 8 HOURS
Refills: 0 | Status: DISCONTINUED | OUTPATIENT
Start: 2021-09-23 | End: 2021-09-28

## 2021-09-23 RX ORDER — SODIUM CHLORIDE 9 MG/ML
1000 INJECTION INTRAMUSCULAR; INTRAVENOUS; SUBCUTANEOUS
Refills: 0 | Status: DISCONTINUED | OUTPATIENT
Start: 2021-09-23 | End: 2021-09-28

## 2021-09-23 RX ORDER — SODIUM CHLORIDE 9 MG/ML
1000 INJECTION, SOLUTION INTRAVENOUS ONCE
Refills: 0 | Status: COMPLETED | OUTPATIENT
Start: 2021-09-23 | End: 2021-09-23

## 2021-09-23 RX ADMIN — SODIUM CHLORIDE 125 MILLILITER(S): 9 INJECTION INTRAMUSCULAR; INTRAVENOUS; SUBCUTANEOUS at 17:30

## 2021-09-23 RX ADMIN — Medication 1 PATCH: at 19:22

## 2021-09-23 RX ADMIN — Medication 50 GRAM(S): at 10:13

## 2021-09-23 RX ADMIN — Medication 1 PATCH: at 18:49

## 2021-09-23 RX ADMIN — SODIUM CHLORIDE 1000 MILLILITER(S): 9 INJECTION, SOLUTION INTRAVENOUS at 07:54

## 2021-09-23 NOTE — H&P ADULT - PROBLEM SELECTOR PLAN 1
Pt w/ nausea, dizziness, abd pain, tachycardia, tachypnea in setting of toxic ingestion.    Will provide supportive care as necessary. IV fluids, Zofran for nausea, and treat and symptoms.

## 2021-09-23 NOTE — H&P ADULT - NSHPREVIEWOFSYSTEMS_GEN_ALL_CORE
Constitutional/General- Denies acute distress.   Eyes- No changes in vision, double vision, blurry vision, does not wear glasses.  ENT- No nasal congestion or rhinorrhea,  changes in hearing, does not wear hearing aids. No odynophagia or dysphagia.   Skin-Denies rashes.  Cardiovascular- Denies chest pain or heart palpitations. No orthopnea/PND.  Pulmonary- Denies shortness of breath, no cough. No pleuritic chest pain or hemoptysis.   Gastrointestinal- + nausea and abd pain, Denies vomiting, diarrhea, bloating, constipation.   Genitourinary-Denies dysuria, frequency, or change in urine odor/appearance.   Musculoskeletal-Denies changes in strength, no joint tenderness or swelling.  Neurologic-Denies changes in memory. Denies headache. weakness, paresthesias, or imbalance.   Endocrine-Denies heat/cold intolerance, weight change, excessive sweating, or polydipsia/polyuria  Psychology-Denies changes in mood. Denies anxiety or depression.  Heme/Lymph-Denies easy bruising. Constitutional/General- Denies acute distress.   Eyes- No changes in vision, double vision, blurry vision, does not wear glasses.  ENT- No nasal congestion or rhinorrhea,  changes in hearing, does not wear hearing aids. No odynophagia or dysphagia.   Skin-Denies rashes.  Cardiovascular- Denies chest pain, +heart palpitations. No orthopnea/PND.  Pulmonary- Denies shortness of breath, no cough. No pleuritic chest pain or hemoptysis.   Gastrointestinal- +nausea and abd pain, Denies vomiting, diarrhea, bloating, constipation.   Genitourinary-Denies dysuria, frequency, or change in urine odor/appearance.   Musculoskeletal-Denies changes in strength, no joint tenderness or swelling.  Neurologic-Denies changes in memory. Denies headache. weakness, paresthesias, or imbalance. +Dizziness   Endocrine-Denies heat/cold intolerance, weight change, excessive sweating, or polydipsia/polyuria  Psychology-Denies changes in mood. +anxiety  Heme/Lymph-Denies easy bruising.

## 2021-09-23 NOTE — BH CONSULTATION LIAISON ASSESSMENT NOTE - NSBHCHARTREVIEWLAB_PSY_A_CORE FT
12.3   8.48  )-----------( 155      ( 23 Sep 2021 08:02 )             39.5   09-23    137  |  104  |  10  ----------------------------<  90  4.3   |  18<L>  |  0.43<L>    Ca    9.6      23 Sep 2021 08:02  Phos  3.2     09-23  Mg     1.50     09-23    TPro  7.5  /  Alb  4.0  /  TBili  0.4  /  DBili  x   /  AST  68<H>  /  ALT  23  /  AlkPhos  149<H>  09-23

## 2021-09-23 NOTE — H&P ADULT - ASSESSMENT
21F w/ hx of hyperthyroidism, depression, PTSD? admitted for supportive care in setting of gabapentin overdose, found to have possible thyrotoxicosis.  21F w/ hx of hyperthyroidism, depression, PTSD? admitted for supportive care in setting of intentional gabapentin overdose, found to have possible thyrotoxicosis.

## 2021-09-23 NOTE — CHART NOTE - NSCHARTNOTEFT_GEN_A_CORE
21F w/ hx of depression, PTSD?, hyperthyroidism presented to the ED after ingestion of 85-90 tablets of 300 mg gabapentin at around 4AM today. Endocrine consulted for hyperthyroidism.     Per team, pt is on methimazole at home (home dose unknown) but ran out of medication recently so hasn't taken for the past week.     TSH <0.1, TT4 19.6, TT3 564.     Alan-Wartofsky score 45 on arrival for fever, tachycardia with HR>140, agitation, highly suggestive of thyroid storm. Tachycardia now improved to , Temperature 37.2, c/w impending thyroid storm.    Recommend:  - add on STAT free T4  - Propanolol 60 mg q6h to titrate to goal normal HR (60-90) with holding parameters for BP   - telemetry monitoring   - Methimazole 20 mg q6h  - Hydrocortisone 100 mg q8h x 24 hrs    DW Team    Full consult tomorrow    Kerry Phan DO, Endocrine Fellow   Pager [phone] from 9-5PM. After hours and on weekends please call [phone]. 21F w/ hx of depression, PTSD?, hyperthyroidism presented to the ED after ingestion of 85-90 tablets of 300 mg gabapentin at around 4AM today. Endocrine consulted for hyperthyroidism.     Per team, pt is on methimazole at home (home dose unknown) but ran out of medication recently so hasn't taken for the past week.     TSH <0.1, TT4 19.6, TT3 564.     Alan-Wartofsky score 45 on arrival for fever, tachycardia with HR>140, agitation, highly suggestive of thyroid storm. Tachycardia now improved to  without BB, Temperature 37.2, score now 25 c/w impending thyroid storm.    Recommend:  - add on STAT free T4  - Propanolol q6-8 hr and titrate to goal normal HR (60-90) with holding parameters for BP and HR  - telemetry monitoring   - Methimazole 20 mg q6h for now    DW Team    Full consult tomorrow    Kerry Phan DO, Endocrine Fellow   Pager [phone] from 9-5PM. After hours and on weekends please call [phone].
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
## 2021-09-23 NOTE — BH CONSULTATION LIAISON ASSESSMENT NOTE - CURRENT MEDICATION
MEDICATIONS  (STANDING):    MEDICATIONS  (PRN):  acetaminophen   Tablet .. 650 milliGRAM(s) Oral every 6 hours PRN Temp greater or equal to 38.5C (101.3F), Mild Pain (1 - 3)  aluminum hydroxide/magnesium hydroxide/simethicone Suspension 30 milliLiter(s) Oral every 4 hours PRN Dyspepsia  melatonin 3 milliGRAM(s) Oral at bedtime PRN Insomnia  ondansetron Injectable 4 milliGRAM(s) IV Push every 8 hours PRN Nausea and/or Vomiting

## 2021-09-23 NOTE — BH CONSULTATION LIAISON ASSESSMENT NOTE - RISK ASSESSMENT
Risk: hx of SA, recent SA, current wavering SI, depression, anxiety, trauma  Protective: support from family, lives with father

## 2021-09-23 NOTE — ED ADULT NURSE REASSESSMENT NOTE - NS ED NURSE REASSESS COMMENT FT1
pt sleeping in bed at this time. arousable to noise, lethargic at this time. md presents to bedside for eval. pt remains sinus tachy on cardiac monitor, denies dizziness, lightheadedness, chest pain. axillary temp 100.5. md huizar made aware. will continue to monitor.

## 2021-09-23 NOTE — ED PROVIDER NOTE - ATTENDING CONTRIBUTION TO CARE
I performed the initial face to face bedside interview with this patient regarding history of present illness, review of symptoms and past medical, social and family history.  I completed an independent physical examination.  I was the initial provider who evaluated this patient.  I have signed out the follow up of any pending tests (i.e. labs, radiological studies) to the resident.  I have discussed the patient’s plan of care and disposition with the resident.     Upon my evaluation, this patient had a high probability of imminent or life-threatening deterioration due to gabapentin overdose, which required my direct attention, intervention, and personal management.  The patient has a  medical condition that impairs one or more vital organ systems.  Frequent personal assessment and adjustment of medical interventions was performed.      I have personally provided 45 minutes of critical care time exclusive of time spent on separately billable procedures. Time includes review of laboratory data, radiology results, discussion with consultants, patient and family; monitoring for potential decompensation, as well as time spent retrieving data and reviewing the chart and documenting the visit. Interventions were performed as documented above.

## 2021-09-23 NOTE — ED PROVIDER NOTE - IV ALTEPLASE ADMIN OUTSIDE HIDDEN
Abdominal Pain    Abdominal pain is pain in the stomach or belly area. Everyone has this pain from time to time. In many cases it goes away on its own. But abdominal pain can sometimes be due to a serious problem, such as appendicitis. So its important to know when to seek help.  Causes of abdominal pain  There are many possible causes of abdominal pain. Common causes in adults include:  · Constipation, diarrhea, or gas  · Stomach acid flowing back up into the esophagus (acid reflux or heartburn)  · Severe acid reflux, called GERD (gastroesophageal reflux disease)  · A sore in the lining of the stomach or small intestine (peptic ulcer)  · Inflammation of the gallbladder, liver, or pancreas  · Gallstones or kidney stones  · Appendicitis   · Intestinal blockage   · An internal organ pushing through a muscle or other tissue (hernia)  · Urinary tract infections  · In women, menstrual cramps, fibroids, or endometriosis  · Inflammation or infection of the intestines  Diagnosing the cause of abdominal pain  Your healthcare provider will do a physical exam help find the cause of your pain. If needed, tests will be ordered. Belly pain has many possible causes. So it can be hard to find the reason for your pain. Giving details about your pain can help. Tell your provider where and when you feel the pain, and what makes it better or worse. Also let your provider know if you have other symptoms such as:  · Fever  · Tiredness  · Upset stomach (nausea)  · Vomiting  · Changes in bathroom habits  Treating abdominal pain  Some causes of pain need emergency medical treatment right away. These include appendicitis or a bowel blockage. Other problems can be treated with rest, fluids, or medicines. Your healthcare provider can give you specific instructions for treatment or self-care based on what is causing your pain.  If you have vomiting or diarrhea, sip water or other clear fluids. When you are ready to eat solid foods again,  start with small amounts of easy-to-digest, low-fat foods. These include apple sauce, toast, or crackers.   When to seek medical care  Call 911 or go to the hospital right away if you:  · Cant pass stool and are vomiting  · Are vomiting blood or have bloody diarrhea or black, tarry diarrhea  · Have chest, neck, or shoulder pain  · Feel like you might pass out  · Have pain in your shoulder blades with nausea  · Have sudden, severe belly pain  · Have new, severe pain unlike any you have felt before  · Have a belly that is rigid, hard, and tender to touch  Call your healthcare provider if you have:  · Pain for more than 5 days  · Bloating for more than 2 days  · Diarrhea for more than 5 days  · A fever of 100.4°F (38.0°C) or higher, or as directed by your provider  · Pain that gets worse  · Weight loss for no reason  · Continued lack of appetite  · Blood in your stool  How to prevent abdominal pain  Here are some tips to help prevent abdominal pain:  · Eat smaller amounts of food at one time.  · Avoid greasy, fried, or other high-fat foods.  · Avoid foods that give you gas.  · Exercise regularly.  · Drink plenty of fluids.  To help prevent GERD symptoms:  · Quit smoking.  · Reduce alcohol and certain foods that increase stomach acid.  · Avoid aspirin and over-the-counter pain and fever medicines (NSAIDS or nonsteroidal anti-inflammatory drugs), if possible  · Lose extra weight.  · Finish eating at least 2 hours before you go to bed or lie down.  · Raise the head of your bed.  Date Last Reviewed: 7/1/2016  © 1735-7164 [employer]. [address] [place] [address], [place] [address]. All rights reserved. This information is not intended as a substitute for professional medical care. Always follow your healthcare professional's instructions.    If you have any questions, please call.  You can reach us at [phone] Monday through Thursday (except holidays) 8:30 a.m. to 5:30 p.m.     Note:  I will be out of the  office 7/25-7/29/19.  So if you have concerns or questions, please contact your primary care provider team or [facility] or go to the Urgent Care on Friday for concerns.     Thank you for using our Primary Care Service!    HAYDEE Venegas, CNP, JAMESP-BC  [facility]    To rate your experience with NAHUN Venegas, click on the link below:      [email or web address]         show

## 2021-09-23 NOTE — PATIENT PROFILE ADULT - 
ADDITIONAL INFORMATION
patient states she is fully vaccinated with pfizer covid19 vaccine. does not have her vaccine card with her. states first dose was last week of march and second dose was second week of april.

## 2021-09-23 NOTE — BH CONSULTATION LIAISON ASSESSMENT NOTE - DIFFERENTIAL
Major depressive Episode vs Adjustment disorder with disturbance of mood/conduct  Acute on chronic worsening of SI  PTSD

## 2021-09-23 NOTE — ED PROVIDER NOTE - OBJECTIVE STATEMENT
22 yo with history of depression presenting after an intentional overdose of about 85 tablets of 300mg Gabapentin that was prescribed to a family member.  Took them around 0400 this morning.  Started to feel abd pain generalized with some nausea as well.  Feeling dizzy.  Symptoms have been getting progressively worse.  +SI no HI AH VH.  Denies other ingestions and states took it with water and soda.

## 2021-09-23 NOTE — H&P ADULT - HISTORY OF PRESENT ILLNESS
21F w/ hx of depression, PTSD?, hyperthyroidism presented to the ED after ingestion of 85-90 tablets of 300 mg gabapentin at around 4AM today. States she took them from a family member prescribed them. States she has had stressors recently, including her father losing her job and her pending eviction from her family home. She presented to the ED with generalized abd pain, nausea, and dizziness. She states she has felt anxious and shaky as well, although she has felt these before due to her hyperthyroidism. States she has SI "on and off", denies HI, AH, VH. Denies any recent alcohol or drug use. She states she has been taking her methimazole 3x daily as prescribed but does not remember the dose.     Contact is her father Ashutosh: [phone].     In the ED:  Vital Signs Last 24 Hrs  T(F): 99 (23 Sep 2021 13:55), Max: 100.5 (23 Sep 2021 12:26)  HR: 109 (23 Sep 2021 13:55) (109 - 148)  BP: 142/84 (23 Sep 2021 13:55) (117/78 - 154/93)  RR: 18 (23 Sep 2021 13:55) (16 - 18)  SpO2: 99% (23 Sep 2021 13:55) (98% - 100%)    She received a 1L bolus LR, repletion of Mg, She was monitored on telemetry with no arrhythmia events. At bedside currently she endorses feelings of stress and anxiety. 21F w/ hx of depression, PTSD?, hyperthyroidism presented to the ED after ingestion of 85-90 tablets of 300 mg gabapentin at around 4AM today. States she took them from a family member prescribed them. States she has had stressors recently, including her father losing her job and her pending eviction from her family home. She presented to the ED with generalized abd pain, nausea, and dizziness. She states she has felt anxious and shaky as well, although she has felt these before due to her hyperthyroidism. States she has SI "on and off", denies HI, AH, VH. Denies any recent alcohol or drug use. She states she has been taking her methimazole 3x daily as prescribed but does not remember the dose. But as per father she has missed several doses because they lost the bottle.    Contact is her father Ashutosh: [phone].     In the ED:  Vital Signs Last 24 Hrs  T(F): 99 (23 Sep 2021 13:55), Max: 100.5 (23 Sep 2021 12:26)  HR: 109 (23 Sep 2021 13:55) (109 - 148)  BP: 142/84 (23 Sep 2021 13:55) (117/78 - 154/93)  RR: 18 (23 Sep 2021 13:55) (16 - 18)  SpO2: 99% (23 Sep 2021 13:55) (98% - 100%)    She received a 1L bolus LR, repletion of Mg, She was monitored on telemetry with no arrhythmia events. At bedside currently she endorses feelings of stress and anxiety.

## 2021-09-23 NOTE — ED ADULT NURSE NOTE - PATIENT ON (OXYGEN DELIVERY METHOD)
Received request via: Pharmacy    Was the patient seen in the last year in this department? Yes    Does the patient have an active prescription (recently filled or refills available) for medication(s) requested? No  
room air
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
## 2021-09-23 NOTE — H&P ADULT - NSHPLABSRESULTS_GEN_ALL_CORE
LABS:                        12.3   8.48  )-----------( 155      ( 23 Sep 2021 08:02 )             39.5         137  |  104  |  10  ----------------------------<  90  4.3   |  18<L>  |  0.43<L>    Ca    9.6      23 Sep 2021 08:02  Phos  3.2       Mg     1.50         TPro  7.5  /  Alb  4.0  /  TBili  0.4  /  DBili  x   /  AST  68<H>  /  ALT  23  /  AlkPhos  149<H>              Urinalysis Basic - ( 23 Sep 2021 08:48 )    Color: Yellow / Appearance: Clear / S.030 / pH: x  Gluc: x / Ketone: Negative  / Bili: Negative / Urobili: <2 mg/dL   Blood: x / Protein: Trace / Nitrite: Negative   Leuk Esterase: Negative / RBC: 4 /HPF / WBC 3 /HPF   Sq Epi: x / Non Sq Epi: 5 /HPF / Bacteria: Occasional        VBG  @ 08:02  pH: 7.41/pCO2: 35 /pO2: 64/HCO3: 22/lactate: 1.7 LABS:                        12.3   8.48  )-----------( 155      ( 23 Sep 2021 08:02 )             39.5         137  |  104  |  10  ----------------------------<  90  4.3   |  18<L>  |  0.43<L>    Ca    9.6      23 Sep 2021 08:02  Phos  3.2       Mg     1.50         TPro  7.5  /  Alb  4.0  /  TBili  0.4  /  DBili  x   /  AST  68<H>  /  ALT  23  /  AlkPhos  149<H>      EKG tracing reviewed and interpreted by me: Sinus tach                 Urinalysis Basic - ( 23 Sep 2021 08:48 )    Color: Yellow / Appearance: Clear / S.030 / pH: x  Gluc: x / Ketone: Negative  / Bili: Negative / Urobili: <2 mg/dL   Blood: x / Protein: Trace / Nitrite: Negative   Leuk Esterase: Negative / RBC: 4 /HPF / WBC 3 /HPF   Sq Epi: x / Non Sq Epi: 5 /HPF / Bacteria: Occasional        VBG  @ 08:02  pH: 7.41/pCO2: 35 /pO2: 64/HCO3: 22/lactate: 1.7

## 2021-09-23 NOTE — H&P ADULT - PROBLEM SELECTOR PLAN 3
Pt on seroquel at home. Anxious, teary, and withdrawn during interview. States she does not see a psychiatrist as an outpatient.     Psychiatry following.

## 2021-09-23 NOTE — H&P ADULT - PROBLEM SELECTOR PLAN 2
Elevated serum t4, t3, low serum TSH. Sinus tachycardia. Tremulous on exam.       Pt self-reports taking methimazole 3x a day as prescribed. Unsure of the dose. Will obtain home doses from father.  Will consider starting BB in setting of thyrotoxicosis.     Endo consulted. Elevated serum t4, t3, low serum TSH. Sinus tachycardia. Tremulous on exam.     Father states pt has not been taking methimazole for the last week or so bc they lost the bottle. States he is unsure of the dose. Will obtain home doses from father.  Will consider starting BB in setting of thyrotoxicosis.     Endo consulted.

## 2021-09-23 NOTE — ED PROVIDER NOTE - PHYSICAL EXAMINATION
Gen: Well appearing in NAD  Head: NC/AT  Eyes: PERRL  Neck: trachea midline  Resp:  No distress CTAB  CV: Tachy RR  Abd: soft TTP but distractible   Ext: no deformities  Neuro:  A&O  non focal  Skin:  Warm and dry as visualized  Psych:  +SI
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
## 2021-09-23 NOTE — ED ADULT NURSE REASSESSMENT NOTE - NS ED NURSE REASSESS COMMENT FT1
report taken from night RN. pt resting comfortably in bed at this time, denies complaints. denies current si/hi. remains on 1:1 with PCA at bedside. pt calm and cooperative at this time. answering questions appropriately at this time. pt in NAD. will continue to monitor.

## 2021-09-23 NOTE — H&P ADULT - ATTENDING COMMENTS
Depression w/ intentional neurontin overdose, supportive care for neurontin overdose, constant observation, Psych consult   Hyperthyroidism, resume Methimazole once dose confirmed, start Propranolol, monitor TFT's, Endo consulted   Tachycardia d/t thyrotoxicosis, start Propranolol, check TTE   Hypomagnesemia, supplement Mg

## 2021-09-23 NOTE — ED ADULT TRIAGE NOTE - CHIEF COMPLAINT QUOTE
Pt c/o dizziness, nausea. Reports "took 90 tablets of 300mg gabapentin around 4AM" to hurt herself.  PMHX hyperthyroidism, depression. 's EKG in progress. Pt c/o dizziness, nausea. Reports "took 90 tablets of 300mg gabapentin around 4AM" to hurt herself.  Arrives with empty pill bottle and reports she took the whole bottle. PMHX hyperthyroidism, depression. 's EKG in progress. Pt c/o dizziness, nausea. Reports "took 90 tablets of 300mg gabapentin around 4AM" to hurt herself.  Arrives with empty pill bottle and reports she took the whole bottle. PMHX hyperthyroidism, depression. 's. Taken to rm #5

## 2021-09-23 NOTE — ED ADULT NURSE NOTE - OBJECTIVE STATEMENT
Pt received to room 5 a/o x 3 x c/o dizziness, nausea headache, palpitations, SI, since 4 AM. pt states she took approx 90 tablets of her fathers 300mg of gabapentin. Pt had the empty bottle in her back and was last filled 9/21. Pt states she felt depressed and just took them. Pt states she has SI once before and never acted on it. Respirations even and unlabored. Lung sounds clear with equal chest rise bilaterally. ABD is soft, non tender, non distended with normal active bowel sounds. Pt denies  HI.  Pt denies visual or auditory hallucinations or other complaints. Pt belongings checked and secured by staff.  Pt changed into gown and scrubs. Pt on cardiac monitor in sinus tach. belongings placed in front of 21 and valuables sent down to security.

## 2021-09-23 NOTE — ED PROVIDER NOTE - PROGRESS NOTE DETAILS
Rafiq-PGY3: pt with intentional gabapentin overdose, approximately 90 capsules of 300 mg gabapentin. Reports ingestion at 0400, reports feeling remorse and not feeling well afterwards and states she brought herself to ED. Denies any other ingestions. Pt history of intentional overdose at age 12, states did not seek medical attention at that time. Pt reports increased stress recently. Daily meds include seroquel, zoloft, and methimazole, denies any extra doses of those medications. Pt noted to be tachycardic to 140s, hemodynamically stable. Spoke with toxicology fellow, states pt out of window for GI decon, recommending observation with supportive treatment. Rafiq-PGY3: pt persistently somnolent, unable to be cleared for psych interview. Discussed with hospitalist, accepted for admission. Text page was sent to the MAR to inform them of the patient's admission. My call back number was included for any follow up questions. Spoke to pt's father Kanu ([phone]) and updated on clinical status.

## 2021-09-23 NOTE — CONSULT NOTE ADULT - ASSESSMENT
·	Gabapentin overdoses are managed supportively.  ·	Observe the patient for 6 hours for resolution of symptoms.  ·	If patient remains hemodynamically stable, and there are no new active complaints then can be cleared.  ·	Agreed to involve psyche in course of care as intent was suicidal.     d/w Dr. Stephan Bajwa (Tox Attending of record)     Thank you for the consult  ·	Gabapentin overdoses are managed supportively.  ·	Patient will required monitoring until resolution of symptoms.  ·	Will continue to follow.     d/w Dr. Stephan Bajwa (Tox Attending of record)     Thank you for the consult

## 2021-09-23 NOTE — ED ADULT NURSE NOTE - CHIEF COMPLAINT QUOTE
Pt c/o dizziness, nausea. Reports "took 90 tablets of 300mg gabapentin around 4AM" to hurt herself.  Arrives with empty pill bottle and reports she took the whole bottle. PMHX hyperthyroidism, depression. 's. Taken to rm #5

## 2021-09-23 NOTE — BH CONSULTATION LIAISON ASSESSMENT NOTE - CASE SUMMARY
Chart reviewed. I agree with NP Jennifer's history, MSE, A/P with below additions. I personally saw the patient on 9/24/2021 in the AM. She remains dysthymic, depressed. States that she is embarrassed. Denies SI currently. Thought process is linear. Attention is good. Understands that she will need inpatient psychiatry.  Agree with plan per above.  Will follow with team.

## 2021-09-23 NOTE — H&P ADULT - NSHPSOCIALHISTORY_GEN_ALL_CORE
Lives with father at home. Does not work currently. She is getting her GED. States she is legally disabled secondary to PTSD.

## 2021-09-23 NOTE — BH CONSULTATION LIAISON ASSESSMENT NOTE - OTHER PAST PSYCHIATRIC HISTORY (INCLUDE DETAILS REGARDING ONSET, COURSE OF ILLNESS, INPATIENT/OUTPATIENT TREATMENT)
Hx of 3 past inpatient psychiatric hospitalization, hx of suicide attempt in 2012 by attempt to hang self (pt used belt and tied to shower curtain chikis; placed around neck for 1 minute but then stopped); denies hx of cutting or other self-injurious behavior.  Patient first started seeing a therapist around Sept. 2011 and stopped in 2013.  She first saw a psychiatrist in Sept. 2012 and stopped in Sept. 2015 because she thought she was feeling better and no longer needed Lexapro.  Patient first started medication (Zoloft) in Oct. 2012. Discharged from [facility] in Nov 2019. most recently saw Dr. Myles, but stopped her home psychiatric medications since January 2020.  has not been in treatment since march 2020.

## 2021-09-23 NOTE — BH CONSULTATION LIAISON ASSESSMENT NOTE - NSBHCHARTREVIEWVS_PSY_A_CORE FT
Vital Signs Last 24 Hrs  T(C): 37.2 (23 Sep 2021 13:55), Max: 38.1 (23 Sep 2021 12:26)  T(F): 99 (23 Sep 2021 13:55), Max: 100.5 (23 Sep 2021 12:26)  HR: 109 (23 Sep 2021 13:55) (109 - 148)  BP: 142/84 (23 Sep 2021 13:55) (117/78 - 154/93)  BP(mean): --  RR: 18 (23 Sep 2021 13:55) (16 - 18)  SpO2: 99% (23 Sep 2021 13:55) (98% - 100%)

## 2021-09-23 NOTE — ED PROVIDER NOTE - CLINICAL SUMMARY MEDICAL DECISION MAKING FREE TEXT BOX
22 yo with intentional gabapentin overdose.  Currently mentating well.  Biggest concern is CNS depression.  Need to consider co-ingestions so will get ASA ETOH APAP.  EKG without significant prolongations or abnormalities aside from rate.  Discussed with tox and symptomatic treatment,  The patient is out of window for charcoal.  Recommending a 6 hour obs period.  Will dispo once 6 hours either to med if pt sedated or remains tachy or to  for intentional ingestion and SI.
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
## 2021-09-23 NOTE — H&P ADULT - NSHPPHYSICALEXAM_GEN_ALL_CORE
VS:T:BP:HR: RR: SpO2 (100% on room air) Ht: Wt: BMI:    General: Alert and oriented to name, place, and date. Tremulous. Teary.   HEENT: EOMI, PERRLA, mid-dilated pupils. +conj injection. +bilateral proptosis.  no nasal discharge, no sinus congestion ,MMM, no oral lesion.  Neck: Supple, no appreciable JVP.   Lungs: Good inspiratory effort, clear to auscultation bilaterally.   Heart: +tachycardic. Regular rhythm. no murmurs/rubs/gallops.   Abdomen: Soft, mild diffuse tenderness to palpation. non-distended, normal bowel sounds, no Hepatosplenomegaly (HSM), no suprapubic tenderness.   Back: Exam limited 2/2 patient discomfort, but no spinal or paraspinal tenderness. No Costrovertebral angle tenderness (CVAT).   Extremities: No clubbing, cyanosis, or edema. Pulses 2+ in all extremities.   Skin: No rash, ecchymosis, petechiae, or nodules.  Neuro: CN II-XII intact. Normal tone.  Strength 5/5 in b/l upper and lower extremities. Sensation to light touch and cold temperature intact throughout. Reflexes 2+ in upper and lower extremities. Coordination intact. VS:T:BP:HR: RR: SpO2 (100% on room air) Ht: Wt: BMI:    General: Alert and oriented to name, place, and date. Tremulous. Teary.   HEENT: EOMI, PERRLA, mid-dilated pupils. +conj injection. +bilateral proptosis.  no nasal discharge, no sinus congestion ,MMM, no oral lesion.  Neck: Supple, no appreciable JVP.   Lungs: Good inspiratory effort, clear to auscultation bilaterally.   Heart: +tachycardic. Regular rhythm. Gr III LUSB murmur   Abdomen: Soft, mild diffuse tenderness to palpation. non-distended, normal bowel sounds, no Hepatosplenomegaly (HSM), no suprapubic tenderness.   Back: Exam limited 2/2 patient discomfort, but no spinal or paraspinal tenderness. No Costrovertebral angle tenderness (CVAT).   Extremities: No clubbing, cyanosis, or edema. Pulses 2+ in all extremities.   Skin: No rash, ecchymosis, petechiae, or nodules.  Neuro: CN II-XII intact. Normal tone.  Strength 5/5 in b/l upper and lower extremities. Sensation to light touch and cold temperature intact throughout. Reflexes 2+ in upper and lower extremities. Coordination intact. Vital Signs Last 24 Hrs  T(C): 37.2 (23 Sep 2021 13:55), Max: 38.1 (23 Sep 2021 12:26)  T(F): 99 (23 Sep 2021 13:55), Max: 100.5 (23 Sep 2021 12:26)  HR: 109 (23 Sep 2021 13:55) (109 - 148)  BP: 142/84 (23 Sep 2021 13:55) (117/78 - 154/93)  BP(mean): --  RR: 18 (23 Sep 2021 13:55) (16 - 18)  SpO2: 99% (23 Sep 2021 13:55) (98% - 100%)    General: Alert and oriented to name, place, and date. Tremulous. Teary.   HEENT: EOMI, PERRLA, mid-dilated pupils. +conj injection. +bilateral proptosis.  no nasal discharge, no sinus congestion ,MMM, no oral lesion.  Neck: Supple, no appreciable JVP.   Lungs: Good inspiratory effort, clear to auscultation bilaterally.   Heart: +tachycardic. Regular rhythm. Gr III LUSB murmur   Abdomen: Soft, mild diffuse tenderness to palpation. non-distended, normal bowel sounds, no Hepatosplenomegaly (HSM), no suprapubic tenderness.   Back: Exam limited 2/2 patient discomfort, but no spinal or paraspinal tenderness. No Costrovertebral angle tenderness (CVAT).   Extremities: No clubbing, cyanosis, or edema. Pulses 2+ in all extremities.   Skin: No rash, ecchymosis, petechiae, or nodules.  Neuro: CN II-XII intact. Normal tone.  Strength 5/5 in b/l upper and lower extremities. Sensation to light touch and cold temperature intact throughout. Reflexes 2+ in upper and lower extremities. Coordination intact.

## 2021-09-24 DIAGNOSIS — E05.00 THYROTOXICOSIS WITH DIFFUSE GOITER WITHOUT THYROTOXIC CRISIS OR STORM: ICD-10-CM

## 2021-09-24 LAB
ALBUMIN SERPL ELPH-MCNC: 3.7 G/DL — SIGNIFICANT CHANGE UP (ref 3.3–5)
ALP SERPL-CCNC: 114 U/L — SIGNIFICANT CHANGE UP (ref 40–120)
ALT FLD-CCNC: 19 U/L — SIGNIFICANT CHANGE UP (ref 4–33)
ANION GAP SERPL CALC-SCNC: 9 MMOL/L — SIGNIFICANT CHANGE UP (ref 7–14)
AST SERPL-CCNC: 26 U/L — SIGNIFICANT CHANGE UP (ref 4–32)
BILIRUB SERPL-MCNC: 0.4 MG/DL — SIGNIFICANT CHANGE UP (ref 0.2–1.2)
BUN SERPL-MCNC: 10 MG/DL — SIGNIFICANT CHANGE UP (ref 7–23)
CALCIUM SERPL-MCNC: 9.2 MG/DL — SIGNIFICANT CHANGE UP (ref 8.4–10.5)
CHLORIDE SERPL-SCNC: 109 MMOL/L — HIGH (ref 98–107)
CO2 SERPL-SCNC: 22 MMOL/L — SIGNIFICANT CHANGE UP (ref 22–31)
CREAT SERPL-MCNC: 0.36 MG/DL — LOW (ref 0.5–1.3)
GLUCOSE SERPL-MCNC: 80 MG/DL — SIGNIFICANT CHANGE UP (ref 70–99)
HCT VFR BLD CALC: 34.6 % — SIGNIFICANT CHANGE UP (ref 34.5–45)
HGB BLD-MCNC: 10.7 G/DL — LOW (ref 11.5–15.5)
MAGNESIUM SERPL-MCNC: 1.7 MG/DL — SIGNIFICANT CHANGE UP (ref 1.6–2.6)
MCHC RBC-ENTMCNC: 22.2 PG — LOW (ref 27–34)
MCHC RBC-ENTMCNC: 30.9 GM/DL — LOW (ref 32–36)
MCV RBC AUTO: 71.8 FL — LOW (ref 80–100)
NRBC # BLD: 0 /100 WBCS — SIGNIFICANT CHANGE UP
NRBC # FLD: 0 K/UL — SIGNIFICANT CHANGE UP
PHOSPHATE SERPL-MCNC: 3 MG/DL — SIGNIFICANT CHANGE UP (ref 2.5–4.5)
PLATELET # BLD AUTO: 156 K/UL — SIGNIFICANT CHANGE UP (ref 150–400)
POTASSIUM SERPL-MCNC: 4 MMOL/L — SIGNIFICANT CHANGE UP (ref 3.5–5.3)
POTASSIUM SERPL-SCNC: 4 MMOL/L — SIGNIFICANT CHANGE UP (ref 3.5–5.3)
PROT SERPL-MCNC: 6.4 G/DL — SIGNIFICANT CHANGE UP (ref 6–8.3)
RBC # BLD: 4.82 M/UL — SIGNIFICANT CHANGE UP (ref 3.8–5.2)
RBC # FLD: 14 % — SIGNIFICANT CHANGE UP (ref 10.3–14.5)
SODIUM SERPL-SCNC: 140 MMOL/L — SIGNIFICANT CHANGE UP (ref 135–145)
WBC # BLD: 6.09 K/UL — SIGNIFICANT CHANGE UP (ref 3.8–10.5)
WBC # FLD AUTO: 6.09 K/UL — SIGNIFICANT CHANGE UP (ref 3.8–10.5)

## 2021-09-24 PROCEDURE — 99232 SBSQ HOSP IP/OBS MODERATE 35: CPT | Mod: GC

## 2021-09-24 PROCEDURE — 90792 PSYCH DIAG EVAL W/MED SRVCS: CPT

## 2021-09-24 PROCEDURE — 99222 1ST HOSP IP/OBS MODERATE 55: CPT | Mod: GC

## 2021-09-24 PROCEDURE — 99254 IP/OBS CNSLTJ NEW/EST MOD 60: CPT | Mod: GC

## 2021-09-24 RX ORDER — METOPROLOL TARTRATE 50 MG
50 TABLET ORAL
Refills: 0 | Status: DISCONTINUED | OUTPATIENT
Start: 2021-09-24 | End: 2021-09-28

## 2021-09-24 RX ADMIN — Medication 3 MILLIGRAM(S): at 01:23

## 2021-09-24 RX ADMIN — Medication 650 MILLIGRAM(S): at 00:30

## 2021-09-24 RX ADMIN — SODIUM CHLORIDE 125 MILLILITER(S): 9 INJECTION INTRAMUSCULAR; INTRAVENOUS; SUBCUTANEOUS at 00:28

## 2021-09-24 RX ADMIN — Medication 650 MILLIGRAM(S): at 00:00

## 2021-09-24 RX ADMIN — Medication 1 PATCH: at 17:16

## 2021-09-24 RX ADMIN — Medication 1 PATCH: at 19:22

## 2021-09-24 RX ADMIN — Medication 1 PATCH: at 17:00

## 2021-09-24 RX ADMIN — Medication 50 MILLIGRAM(S): at 21:52

## 2021-09-24 RX ADMIN — Medication 1 PATCH: at 06:29

## 2021-09-24 NOTE — CONSULT NOTE ADULT - SUBJECTIVE AND OBJECTIVE BOX
MEDICAL TOXICOLOGY CONSULT    HPI: 21 Yr old female k/c hyperthyroidism, depression with intentional OD on ~ 90 pills of Gabapentin 300 mg each at 4 AM. Complaints of nausea, dizziness, abdominal pain. Denies co-ingestions.   Patient has been upset lately, lives with her father and has a limited social [place].     ONSET / TIME of exposure(s): 4 AM    QUANTITY of exposure(s):  ~ 90 pills of Gabapentin 300 mg each    ROUTE of exposure: Ingestion    CONTEXT of exposure: Home    ASSOCIATED symptoms: nausea, dizziness, abdominal pain.    MEDICATION HISTORY: Seroquel, Ambien    RECREATIONAL / ETHANOL / SUPPLEMENT USE: Not applicable    SOCIAL Hx:  lives with father, no element of family stressor     REVIEW OF SYSTEMS:       General:  no fever, chills, fatigue  Eyes:  no blurry vision, double vision, or diminished acuity  ENT:  no decreased acuity, sore throat, dysphagia  Cardiac: no chest pain, syncope   Lung:  no cough, shortness of breath   GI:  c/o nausea, abdominal pain, no vomiting, diarrhea, or constipation  Genitourinary: no dysuria, hematuria  Skin: no rash, lesions  Neuro:  c/o tremors, headache, dizziness, No weakness/numbness,  change in speech, seizure    PHYSICAL EXAM    General:    Head:  normocephalic & atraumatic  Eyes:  extra-occular movement is intact  Pupils:  5 mm dilated, symmetric, reactive to light  Ear, nose, throat:  mucosa is moist, dentition is intact  Neck:  supple  Respiratory:  normal effort, clear to auscultation bilaterally, no rales/ronchi/wheezing  Cardiac:  rate is 120, normal rhythm   Abdomen:  Soft, nondistended, nontender, +bowel sounds  :  deferred  Skin:  normal   Neurologic:  no Clonus, Tremor, Reflexes are normal, extremities are supple, cranial nerves II-XII intact, Level of consciousness is alert    Vital Signs Last 24 Hrs  T(C): 37.1 (23 Sep 2021 06:56), Max: 37.1 (23 Sep 2021 06:56)  T(F): 98.8 (23 Sep 2021 06:56), Max: 98.8 (23 Sep 2021 06:56)  HR: 133 (23 Sep 2021 08:30) (133 - 148)  BP: 117/78 (23 Sep 2021 08:30) (117/78 - 154/93)  RR: 18 (23 Sep 2021 08:30) (16 - 18)  SpO2: 98% (23 Sep 2021 08:30) (98% - 100%)    ECG:  rate 145, regular rhythm, QRS 80, QTc 450    
  HPI:  21F w/ hx of depression, PTSD?, hyperthyroidism presented to the ED after ingestion of 85-90 tablets of 300 mg gabapentin.    Reports diagnosed initially with Hashimoto's at age 15 and thinks she was on Levothyroxine for period of time. Referred to new endocrinologist about 1.5 years ago where she was told she was hyperthyroid but unsure of etiology, doesn't recall being told she has Graves' dx. Reports she has had thyroid US before and not told of any nodules. Stated on methimazole at that time. [facility] Endocrinologist moved out of state so she has been following with PMD for mgmt for the past couple of months. [facility] PMD increase dose 6 weeks ago because thyroid levels were high. Father confirmed home dose of methimazole 10 mg TID with photo of methimazole pill bottle. Titrated up from 5 mg TID 6 weeks ago by PMD. Pt admits to chronically having hyperthyroid symptoms since being diagnosed -- palpitations, worsening anxiety, frequent BMs, weight loss. Also admits worsening eye bulging, eye pain with movement at times, dry gritty sensation. Reports vision is ok. Admits to increasing size of thyroid goiter recently a/w left sided neck discomfort, choking sensation at times. She currently denies nausea, vomiting, abd pain, fever, chills.     PAST MEDICAL & SURGICAL HISTORY:  Depression    Hyperthyroidism    No significant past surgical history        FAMILY HISTORY:  mother and grandfather with hypothyroidism         Social History:  +tobacco use 1PPD    Outpatient Medications: Home Medications:  Zoloft:  orally  (08 Jul 2015 20:27)      MEDICATIONS  (STANDING):  influenza   Vaccine 0.5 milliLiter(s) IntraMuscular once  methimazole 20 milliGRAM(s) Oral <User Schedule>  nicotine -   7 mG/24Hr(s) Patch 1 patch Transdermal daily  propranolol 20 milliGRAM(s) Oral every 6 hours  sodium chloride 0.9%. 1000 milliLiter(s) (125 mL/Hr) IV Continuous <Continuous>    MEDICATIONS  (PRN):  acetaminophen   Tablet .. 650 milliGRAM(s) Oral every 6 hours PRN Temp greater or equal to 38.5C (101.3F), Mild Pain (1 - 3)  aluminum hydroxide/magnesium hydroxide/simethicone Suspension 30 milliLiter(s) Oral every 4 hours PRN Dyspepsia  LORazepam     Tablet 0.5 milliGRAM(s) Oral every 6 hours PRN Anxiety  melatonin 3 milliGRAM(s) Oral at bedtime PRN Insomnia  ondansetron Injectable 4 milliGRAM(s) IV Push every 8 hours PRN Nausea and/or Vomiting      Allergies    cherries - red spots/vomiting (Other)  penicillins (Other)  Zithromax (Unknown)    Intolerances      Review of Systems:  Constitutional: No fever, chills   Neuro: +tremors, headache   Cardiovascular: No chest pain, +palpitations  Respiratory: No SOB, no cough  GI: No nausea, vomiting, abdominal pain  : No dysuria, polyuria   Skin: no rash, ulcers   Psych: no depression, +anxiety   Endocrine: no polyphagia, polydipsia     ALL OTHER SYSTEMS REVIEWED AND NEGATIVE        PHYSICAL EXAM:  VITALS: T(C): 37 (09-24-21 @ 18:14)  T(F): 98.6 (09-24-21 @ 18:14), Max: 98.6 (09-24-21 @ 18:14)  HR: 85 (09-24-21 @ 18:14) (85 - 95)  BP: 145/86 (09-24-21 @ 18:14) (110/50 - 147/84)  RR:  (18 - 18)  SpO2:  (98% - 100%)  Wt(kg): --  GENERAL: NAD  EYES: +proptosis, +lid lag   HEENT:  Atraumatic, Normocephalic, moist mucous membranes  THYROID: diffusely enlarged, no palpable nodules   RESPIRATORY: Clear to auscultation bilaterally; No rales, rhonchi, wheezing  CARDIOVASCULAR: Regular rate and rhythm; No murmurs  GI: Soft, nontender, non distended, normal bowel sounds  SKIN: Dry, intact, No rashes or lesions  NEURO: AOx3, moves all extremities spontaneously, no tremor    PSYCH: appropriate mood                              10.7   6.09  )-----------( 156      ( 24 Sep 2021 07:55 )             34.6       09-24    140  |  109<H>  |  10  ----------------------------<  80  4.0   |  22  |  0.36<L>    EGFR if : 179  EGFR if non : 154    Ca    9.2      09-24  Mg     1.70     09-24  Phos  3.0     09-24    TPro  6.4  /  Alb  3.7  /  TBili  0.4  /  DBili  x   /  AST  26  /  ALT  19  /  AlkPhos  114  09-24      Thyroid Function Tests:  09-23 @ 08:06 TSH -- FreeT4 4.4 T3 564 Anti TPO -- Anti Thyroglobulin Ab -- TSI --  09-23 @ 08:02 TSH <0.10 FreeT4 -- T3 -- Anti TPO -- Anti Thyroglobulin Ab -- TSI --              Radiology:

## 2021-09-24 NOTE — PROGRESS NOTE ADULT - SUBJECTIVE AND OBJECTIVE BOX
Patient is a 21y old  Female who presents with a chief complaint of Gabapentin OD (23 Sep 2021 15:19)      SUBJECTIVE / OVERNIGHT EVENTS:    No acute events overnight. No events on telemetry. HR has been in 90s sinus. States her sx are improving. Nausea, dizziness, and abd pain are absent. States she has a history of migraines with aura and had aura sx this AM. Denies tinnitus. Mood is much improved.       MEDICATIONS  (STANDING):  influenza   Vaccine 0.5 milliLiter(s) IntraMuscular once  methimazole 20 milliGRAM(s) Oral <User Schedule>  nicotine -   7 mG/24Hr(s) Patch 1 patch Transdermal daily  propranolol 20 milliGRAM(s) Oral every 6 hours  sodium chloride 0.9%. 1000 milliLiter(s) (125 mL/Hr) IV Continuous <Continuous>    MEDICATIONS  (PRN):  acetaminophen   Tablet .. 650 milliGRAM(s) Oral every 6 hours PRN Temp greater or equal to 38.5C (101.3F), Mild Pain (1 - 3)  aluminum hydroxide/magnesium hydroxide/simethicone Suspension 30 milliLiter(s) Oral every 4 hours PRN Dyspepsia  LORazepam     Tablet 0.5 milliGRAM(s) Oral every 6 hours PRN Anxiety  melatonin 3 milliGRAM(s) Oral at bedtime PRN Insomnia  ondansetron Injectable 4 milliGRAM(s) IV Push every 8 hours PRN Nausea and/or Vomiting      Vital Signs Last 24 Hrs  T(C): 36.8 (24 Sep 2021 06:22), Max: 38.1 (23 Sep 2021 12:26)  T(F): 98.3 (24 Sep 2021 06:22), Max: 100.5 (23 Sep 2021 12:26)  HR: 88 (24 Sep 2021 06:22) (87 - 133)  BP: 134/75 (24 Sep 2021 06:22) (110/50 - 151/72)  BP(mean): --  RR: 18 (24 Sep 2021 06:22) (17 - 18)  SpO2: 99% (24 Sep 2021 06:22) (98% - 100%)      PHYSICAL EXAM  GENERAL: NAD, well-developed  HEAD:  Atraumatic, Normocephalic  EYES: EOMI, PERRLA, conjunctiva and sclera clear  NECK: Supple, No JVD  CHEST/LUNG: Clear to auscultation bilaterally; No wheeze  HEART: Regular rate and rhythm; No murmurs, rubs, or gallops  ABDOMEN: Soft, Nontender, Nondistended; Bowel sounds present  EXTREMITIES:  2+ Peripheral Pulses, No clubbing, cyanosis, or edema  PSYCH: AAOx3. Mood is improved. Still endorses feeling of guilt. SI not as evident today.   SKIN: No rashes or lesions    CAPILLARY BLOOD GLUCOSE        I&O's Summary      LABS:                        12.3   8.48  )-----------( 155      ( 23 Sep 2021 08:02 )             39.5         137  |  104  |  10  ----------------------------<  90  4.3   |  18<L>  |  0.43<L>    Ca    9.6      23 Sep 2021 08:02  Phos  3.2       Mg     1.50         TPro  7.5  /  Alb  4.0  /  TBili  0.4  /  DBili  x   /  AST  68<H>  /  ALT  23  /  AlkPhos  149<H>            Urinalysis Basic - ( 23 Sep 2021 08:48 )    Color: Yellow / Appearance: Clear / S.030 / pH: x  Gluc: x / Ketone: Negative  / Bili: Negative / Urobili: <2 mg/dL   Blood: x / Protein: Trace / Nitrite: Negative   Leuk Esterase: Negative / RBC: 4 /HPF / WBC 3 /HPF   Sq Epi: x / Non Sq Epi: 5 /HPF / Bacteria: Occasional        RADIOLOGY & ADDITIONAL TESTS:     MICROBIOLOGY:    ANTIMICROBIALS:    CONSULTS: Patient is a 21y old  Female who presents with a chief complaint of Gabapentin OD (23 Sep 2021 15:19)      SUBJECTIVE / OVERNIGHT EVENTS:    No acute events overnight. No events on telemetry. HR has been in 90s sinus. States her sx are improving. Nausea, dizziness, and abd pain are absent. States she has a history of migraines with aura and had aura sx this AM. Denies tinnitus. Mood is much improved.       MEDICATIONS  (STANDING):  influenza   Vaccine 0.5 milliLiter(s) IntraMuscular once  methimazole 20 milliGRAM(s) Oral <User Schedule>  nicotine -   7 mG/24Hr(s) Patch 1 patch Transdermal daily  propranolol 20 milliGRAM(s) Oral every 6 hours  sodium chloride 0.9%. 1000 milliLiter(s) (125 mL/Hr) IV Continuous <Continuous>    MEDICATIONS  (PRN):  acetaminophen   Tablet .. 650 milliGRAM(s) Oral every 6 hours PRN Temp greater or equal to 38.5C (101.3F), Mild Pain (1 - 3)  aluminum hydroxide/magnesium hydroxide/simethicone Suspension 30 milliLiter(s) Oral every 4 hours PRN Dyspepsia  LORazepam     Tablet 0.5 milliGRAM(s) Oral every 6 hours PRN Anxiety  melatonin 3 milliGRAM(s) Oral at bedtime PRN Insomnia  ondansetron Injectable 4 milliGRAM(s) IV Push every 8 hours PRN Nausea and/or Vomiting      Vital Signs Last 24 Hrs  T(C): 36.8 (24 Sep 2021 06:22), Max: 38.1 (23 Sep 2021 12:26)  T(F): 98.3 (24 Sep 2021 06:22), Max: 100.5 (23 Sep 2021 12:26)  HR: 88 (24 Sep 2021 06:22) (87 - 133)  BP: 134/75 (24 Sep 2021 06:22) (110/50 - 151/72)  BP(mean): --  RR: 18 (24 Sep 2021 06:22) (17 - 18)  SpO2: 99% (24 Sep 2021 06:22) (98% - 100%)      PHYSICAL EXAM  GENERAL: NAD, well-developed  HEAD:  Atraumatic, Normocephalic  EYES: EOMI, PERRLA, conjunctiva and sclera clear  NECK: Supple, No JVD  CHEST/LUNG: Clear to auscultation bilaterally; No wheeze  HEART: Regular rate and rhythm; +systolic murmur best heard at LUSB  ABDOMEN: Soft, Nontender, Nondistended; Bowel sounds present  EXTREMITIES:  2+ Peripheral Pulses, No clubbing, cyanosis, or edema  PSYCH: AAOx3. Mood is improved. Still endorses feeling of guilt. SI not as evident today.   SKIN: No rashes or lesions    CAPILLARY BLOOD GLUCOSE        I&O's Summary      LABS:                        12.3   8.48  )-----------( 155      ( 23 Sep 2021 08:02 )             39.5         137  |  104  |  10  ----------------------------<  90  4.3   |  18<L>  |  0.43<L>    Ca    9.6      23 Sep 2021 08:02  Phos  3.2       Mg     1.50         TPro  7.5  /  Alb  4.0  /  TBili  0.4  /  DBili  x   /  AST  68<H>  /  ALT  23  /  AlkPhos  149<H>            Urinalysis Basic - ( 23 Sep 2021 08:48 )    Color: Yellow / Appearance: Clear / S.030 / pH: x  Gluc: x / Ketone: Negative  / Bili: Negative / Urobili: <2 mg/dL   Blood: x / Protein: Trace / Nitrite: Negative   Leuk Esterase: Negative / RBC: 4 /HPF / WBC 3 /HPF   Sq Epi: x / Non Sq Epi: 5 /HPF / Bacteria: Occasional

## 2021-09-24 NOTE — CONSULT NOTE ADULT - ATTENDING COMMENTS
21F with hyperthyroidism suspected due to Graves' disease, initially presenting with tachycardia and TFTs showing uncontrolled hyperthyroidism.  Today vital signs improved. Clarified home regimen was on methimazole 10mg TID, occasional missed dose.  Change to methimazole 40mg once daily (can take full dose at once to help avoid missed doses).  Adjust beta blocker to fewer doses per day as well.  Should be able to be discharged from endocrine standpoint tomorrow if all vitals remain stable.  Outpatient endocrine follow up is recommended (currently following with PCP for hyperthyroidism management).    Sowmya Walker MD  Division of Endocrinology  Pager: [phone]    If after 6PM or before 9AM, or on weekends/holidays, please call endocrine answering service for assistance ([phone]).  For nonurgent matters email [email or web address] for assistance.

## 2021-09-24 NOTE — CONSULT NOTE ADULT - ASSESSMENT
21F w/ hx of depression, PTSD?, hyperthyroidism presented to the ED after ingestion of 85-90 tablets of 300 mg gabapentin. Endocrine consulted for hyperthyroidism.    #hyperthyroidism likely second to Graves dx, less likely toxic MNG or thyroiditis  Not in thyroid storm  - recommend to adjust methimazole to 40 mg daily   - recommend change BB to metoprolol 50 mg BID for preparation for DC   - clinical picture c/w Graves but recommend to check TSH receptor ab and TSI ab to confirm  - can be discharged from endocrine standpoint  Discharge plan:  - recommend discharge on methimazole 40 mg daily given hyperthyroidism uncontrolled on current home dose of 30 mg daily, and metoprolol 50 mg BID with instruction to titrate to goal normal HR (60-90) at home.   - Outpatient f/u with Endocrinology @ [facility] [address] [place] [address], [address], [place] [address] [phone]. Pt should have repeat TFTs in 6-8 weeks outpatient. May need f/u with PMD in interim (suggest referral to new PMD given pt/father unhappy with care of current PMD)    #Graves' opthalmopathy   - history and PE c/w Graves' eye disease   - discussed worsening of graves eye disease with tobacco use and counseled on smoking cessation  - outpatient f/u with Endocrinology as well as Optho    DW team    Kerry Phan DO, Endocrine Fellow   Pager [phone] from 9-5PM. After hours and on weekends please call [phone].     21F w/ hx of depression, PTSD?, hyperthyroidism presented to the ED after ingestion of 85-90 tablets of 300 mg gabapentin. Endocrine consulted for hyperthyroidism.    #hyperthyroidism likely second to Graves dx, less likely toxic MNG or thyroiditis  Not in thyroid storm  - recommend to adjust methimazole to 40 mg daily   - recommend change BB to metoprolol 50 mg BID for preparation for DC   - clinical picture c/w Graves but recommend to check TSH receptor ab and TSI ab to confirm  - likely can be discharged tomorrow from endocrine standpoint  Discharge plan:  - recommend discharge on methimazole 40 mg daily given hyperthyroidism uncontrolled on current home dose of 30 mg daily, and metoprolol 50 mg BID with instruction to titrate to goal normal HR (60-90) at home.   - Outpatient f/u with Endocrinology @ [facility] [address] [place] [address], [address], [place] [address] [phone]. Pt should have repeat TFTs in 6-8 weeks outpatient. May need f/u with PMD in interim (suggest referral to new PMD given pt/father unhappy with care of current PMD)    #Graves' opthalmopathy   - history and PE c/w Graves' eye disease   - discussed worsening of graves eye disease with tobacco use and counseled on smoking cessation  - outpatient f/u with Endocrinology as well as Optho    DW team    Kerry Phan DO, Endocrine Fellow   Pager [phone] from 9-5PM. After hours and on weekends please call [phone].

## 2021-09-24 NOTE — PROGRESS NOTE ADULT - ASSESSMENT
21F w/ hx of hyperthyroidism, depression, PTSD? admitted for supportive care in setting of intentional gabapentin overdose, found to have possible thyrotoxicosis.

## 2021-09-24 NOTE — CHART NOTE - NSCHARTNOTEFT_GEN_A_CORE
Brief Psych Note:  Please see my attestation from SYLVIA Rodriguez's consult note on 9/23.  I saw the patient on 9/24.

## 2021-09-25 LAB
ANION GAP SERPL CALC-SCNC: 12 MMOL/L — SIGNIFICANT CHANGE UP (ref 7–14)
BUN SERPL-MCNC: 8 MG/DL — SIGNIFICANT CHANGE UP (ref 7–23)
CALCIUM SERPL-MCNC: 9.5 MG/DL — SIGNIFICANT CHANGE UP (ref 8.4–10.5)
CHLORIDE SERPL-SCNC: 107 MMOL/L — SIGNIFICANT CHANGE UP (ref 98–107)
CO2 SERPL-SCNC: 20 MMOL/L — LOW (ref 22–31)
CREAT SERPL-MCNC: 0.35 MG/DL — LOW (ref 0.5–1.3)
GLUCOSE SERPL-MCNC: 90 MG/DL — SIGNIFICANT CHANGE UP (ref 70–99)
HCT VFR BLD CALC: 36.6 % — SIGNIFICANT CHANGE UP (ref 34.5–45)
HGB BLD-MCNC: 11.3 G/DL — LOW (ref 11.5–15.5)
MAGNESIUM SERPL-MCNC: 1.6 MG/DL — SIGNIFICANT CHANGE UP (ref 1.6–2.6)
MCHC RBC-ENTMCNC: 22 PG — LOW (ref 27–34)
MCHC RBC-ENTMCNC: 30.9 GM/DL — LOW (ref 32–36)
MCV RBC AUTO: 71.3 FL — LOW (ref 80–100)
NRBC # BLD: 0 /100 WBCS — SIGNIFICANT CHANGE UP
NRBC # FLD: 0 K/UL — SIGNIFICANT CHANGE UP
PHOSPHATE SERPL-MCNC: 2.5 MG/DL — SIGNIFICANT CHANGE UP (ref 2.5–4.5)
PLATELET # BLD AUTO: 200 K/UL — SIGNIFICANT CHANGE UP (ref 150–400)
POTASSIUM SERPL-MCNC: 4.3 MMOL/L — SIGNIFICANT CHANGE UP (ref 3.5–5.3)
POTASSIUM SERPL-SCNC: 4.3 MMOL/L — SIGNIFICANT CHANGE UP (ref 3.5–5.3)
RBC # BLD: 5.13 M/UL — SIGNIFICANT CHANGE UP (ref 3.8–5.2)
RBC # FLD: 13.9 % — SIGNIFICANT CHANGE UP (ref 10.3–14.5)
SODIUM SERPL-SCNC: 139 MMOL/L — SIGNIFICANT CHANGE UP (ref 135–145)
WBC # BLD: 6.72 K/UL — SIGNIFICANT CHANGE UP (ref 3.8–10.5)
WBC # FLD AUTO: 6.72 K/UL — SIGNIFICANT CHANGE UP (ref 3.8–10.5)

## 2021-09-25 PROCEDURE — 99232 SBSQ HOSP IP/OBS MODERATE 35: CPT | Mod: GC

## 2021-09-25 RX ORDER — LANOLIN ALCOHOL/MO/W.PET/CERES
3 CREAM (GRAM) TOPICAL AT BEDTIME
Refills: 0 | Status: DISCONTINUED | OUTPATIENT
Start: 2021-09-25 | End: 2021-09-28

## 2021-09-25 RX ADMIN — Medication 1 PATCH: at 06:39

## 2021-09-25 RX ADMIN — Medication 50 MILLIGRAM(S): at 22:13

## 2021-09-25 RX ADMIN — Medication 1 PATCH: at 15:37

## 2021-09-25 RX ADMIN — Medication 3 MILLIGRAM(S): at 22:13

## 2021-09-25 RX ADMIN — Medication 50 MILLIGRAM(S): at 10:08

## 2021-09-25 RX ADMIN — Medication 1 PATCH: at 19:18

## 2021-09-25 RX ADMIN — Medication 1 PATCH: at 17:39

## 2021-09-25 NOTE — BH CONSULTATION LIAISON PROGRESS NOTE - NSBHCHARTREVIEWLAB_PSY_A_CORE FT
11.3   6.72  )-----------( 200      ( 25 Sep 2021 06:46 )             36.6     09-25    139  |  107  |  8   ----------------------------<  90  4.3   |  20<L>  |  0.35<L>    Ca    9.5      25 Sep 2021 06:46  Phos  2.5     09-25  Mg     1.60     09-25    TPro  6.4  /  Alb  3.7  /  TBili  0.4  /  DBili  x   /  AST  26  /  ALT  19  /  AlkPhos  114  09-24

## 2021-09-25 NOTE — PROGRESS NOTE ADULT - ASSESSMENT
21F w/ hx of hyperthyroidism, depression, PTSD? admitted for supportive care in setting of intentional gabapentin overdose, found to have possible thyrotoxicosis. TFTs improving, mood improving.

## 2021-09-25 NOTE — PROGRESS NOTE ADULT - SUBJECTIVE AND OBJECTIVE BOX
Patient is a 21y old  Female who presents with a chief complaint of Gabapentin OD (24 Sep 2021 19:26)      SUBJECTIVE / OVERNIGHT EVENTS:    NAEO. Pt improved from a symptomatic standpoint. Mood is better today.       MEDICATIONS  (STANDING):  influenza   Vaccine 0.5 milliLiter(s) IntraMuscular once  methimazole 40 milliGRAM(s) Oral daily  metoprolol tartrate 50 milliGRAM(s) Oral <User Schedule>  nicotine -   7 mG/24Hr(s) Patch 1 patch Transdermal daily  sodium chloride 0.9%. 1000 milliLiter(s) (125 mL/Hr) IV Continuous <Continuous>    MEDICATIONS  (PRN):  acetaminophen   Tablet .. 650 milliGRAM(s) Oral every 6 hours PRN Temp greater or equal to 38.5C (101.3F), Mild Pain (1 - 3)  aluminum hydroxide/magnesium hydroxide/simethicone Suspension 30 milliLiter(s) Oral every 4 hours PRN Dyspepsia  LORazepam     Tablet 0.5 milliGRAM(s) Oral every 6 hours PRN Anxiety  melatonin 3 milliGRAM(s) Oral at bedtime PRN Insomnia  ondansetron Injectable 4 milliGRAM(s) IV Push every 8 hours PRN Nausea and/or Vomiting      Vital Signs Last 24 Hrs  T(C): 36.8 (25 Sep 2021 06:00), Max: 37 (24 Sep 2021 18:14)  T(F): 98.3 (25 Sep 2021 06:00), Max: 98.6 (24 Sep 2021 18:14)  HR: 85 (25 Sep 2021 06:00) (78 - 95)  BP: 137/75 (25 Sep 2021 06:00) (111/48 - 145/86)  BP(mean): --  RR: 18 (25 Sep 2021 06:00) (18 - 18)  SpO2: 97% (25 Sep 2021 06:00) (97% - 100%)      PHYSICAL EXAM  GENERAL: NAD, well-developed  HEAD:  Atraumatic, Normocephalic  EYES: EOMI, PERRLA, conjunctiva and sclera clear  NECK: Supple, No JVD  CHEST/LUNG: Clear to auscultation bilaterally; No wheeze  HEART: Regular rate and rhythm; +systolic murmur best heard at LUSB  ABDOMEN: Soft, Nontender, Nondistended; Bowel sounds present  EXTREMITIES:  2+ Peripheral Pulses, No clubbing, cyanosis, or edema  PSYCH: AAOx3. Mood is improved. Still endorses feeling of guilt. SI not as evident today.   SKIN: No rashes or lesions    CAPILLARY BLOOD GLUCOSE        I&O's Summary      LABS:                        11.3   6.72  )-----------( 200      ( 25 Sep 2021 06:46 )             36.6         139  |  107  |  8   ----------------------------<  90  4.3   |  20<L>  |  0.35<L>    Ca    9.5      25 Sep 2021 06:46  Phos  2.5       Mg     1.60         TPro  6.4  /  Alb  3.7  /  TBili  0.4  /  DBili  x   /  AST  26  /  ALT  19  /  AlkPhos  114            Urinalysis Basic - ( 23 Sep 2021 08:48 )    Color: Yellow / Appearance: Clear / S.030 / pH: x  Gluc: x / Ketone: Negative  / Bili: Negative / Urobili: <2 mg/dL   Blood: x / Protein: Trace / Nitrite: Negative   Leuk Esterase: Negative / RBC: 4 /HPF / WBC 3 /HPF   Sq Epi: x / Non Sq Epi: 5 /HPF / Bacteria: Occasional

## 2021-09-26 LAB — THYROPEROXIDASE AB SERPL-ACNC: 2276 IU/ML — HIGH

## 2021-09-26 PROCEDURE — 99232 SBSQ HOSP IP/OBS MODERATE 35: CPT | Mod: GC

## 2021-09-26 PROCEDURE — 93306 TTE W/DOPPLER COMPLETE: CPT | Mod: 26

## 2021-09-26 RX ADMIN — Medication 50 MILLIGRAM(S): at 11:12

## 2021-09-26 RX ADMIN — Medication 1 PATCH: at 19:08

## 2021-09-26 RX ADMIN — Medication 1 PATCH: at 11:12

## 2021-09-26 RX ADMIN — Medication 50 MILLIGRAM(S): at 22:01

## 2021-09-26 RX ADMIN — Medication 1 PATCH: at 06:40

## 2021-09-26 RX ADMIN — Medication 1 PATCH: at 16:24

## 2021-09-26 RX ADMIN — Medication 3 MILLIGRAM(S): at 22:01

## 2021-09-26 NOTE — PROGRESS NOTE ADULT - SUBJECTIVE AND OBJECTIVE BOX
Charan Aquino MD   PGY3 Internal Medicine  Pager [phone] ([facility]) [phone] ([facility])  Available on Microsoft Teams     SUBJECTIVE / OVERNIGHT EVENTS:      No acute events overnight. Patient seen and evaluated at bedside. No fever/chills.  Denies SOB at rest, chest pain, palpitations, abdominal pain, nausea/vomiting    MEDICATIONS  (STANDING):  influenza   Vaccine 0.5 milliLiter(s) IntraMuscular once  melatonin 3 milliGRAM(s) Oral at bedtime  methimazole 40 milliGRAM(s) Oral daily  metoprolol tartrate 50 milliGRAM(s) Oral <User Schedule>  nicotine -   7 mG/24Hr(s) Patch 1 patch Transdermal daily  sodium chloride 0.9%. 1000 milliLiter(s) (125 mL/Hr) IV Continuous <Continuous>    MEDICATIONS  (PRN):  acetaminophen   Tablet .. 650 milliGRAM(s) Oral every 6 hours PRN Temp greater or equal to 38.5C (101.3F), Mild Pain (1 - 3)  aluminum hydroxide/magnesium hydroxide/simethicone Suspension 30 milliLiter(s) Oral every 4 hours PRN Dyspepsia  LORazepam     Tablet 0.5 milliGRAM(s) Oral every 6 hours PRN Anxiety  ondansetron Injectable 4 milliGRAM(s) IV Push every 8 hours PRN Nausea and/or Vomiting      CAPILLARY BLOOD GLUCOSE        I&O's Summary    25 Sep 2021 07:01  -  26 Sep 2021 07:00  --------------------------------------------------------  IN: 480 mL / OUT: 0 mL / NET: 480 mL        PHYSICAL EXAM:  Vital Signs Last 24 Hrs  T(C): 36.7 (26 Sep 2021 05:48), Max: 36.7 (25 Sep 2021 10:00)  T(F): 98.1 (26 Sep 2021 05:48), Max: 98.1 (25 Sep 2021 10:00)  HR: 80 (26 Sep 2021 05:48) (79 - 84)  BP: 136/67 (26 Sep 2021 05:48) (125/66 - 146/84)  BP(mean): --  RR: 17 (26 Sep 2021 05:48) (16 - 18)  SpO2: 98% (26 Sep 2021 05:48) (96% - 100%)    ____________________  PHYSICAL EXAM:  · CONSTITUTIONAL:	Well-developed, well nourished  · NECK:	No bruits; no thyromegaly. No JVD  ·RESPIRATORY:   Clear to auscultation. Good air movement.  no rales,rhonchi or wheeze. No increased work of breathing.   · CARDIOVASCULAR	RRR  no m/r/g  . GASTROINTESTINAL:  Soft, non tender. No organomegaly. No guarding.  . GENITOURINARY:  No suprapubic tenderness. No costrovertrebral angle tenderness.   . EXTREMITIES: Warm. No cyanosis, clubbing or edema. DP/PT pulses intact.  · NEUROLOGICAL:   alert and oriented x 3; 5/5 strength in b/l extremities. No sensory deficits.   · SKIN:	No lesions; no rash  . LYMPH NODES:	No lymphadedenopathy  · MUSCULOSKELETAL:   No calf tenderness  no joint swelling    LABS:                        11.3   6.72  )-----------( 200      ( 25 Sep 2021 06:46 )             36.6     09-25    139  |  107  |  8   ----------------------------<  90  4.3   |  20<L>  |  0.35<L>    Ca    9.5      25 Sep 2021 06:46  Phos  2.5     09-25  Mg     1.60     09-25

## 2021-09-26 NOTE — PROGRESS NOTE ADULT - ATTENDING COMMENTS
21F w/ hx of hyperthyroidism, depression, PTSD? admitted for intentional neurontin overdose, and also thyrotoxicosis.    Assessment/plan:  # neurontin overdose: supportive care, constant observation, no standing psych meds at this time. DC to [facility] next week    # thyrotoxicosis: s/p propranolol/steroids, TFTs/mood improving, on methimazole, f/u endo
21F w/ hx of hyperthyroidism, depression, PTSD? admitted for intentional neurontin overdose, and also thyrotoxicosis.  Pt is in good spirits, awake/alert, denies SI/hallucinations.    Assessment/plan:  # neurontin overdose: supportive care, constant observation, no standing psych meds at this time. DC to [facility] next week    # thyrotoxicosis: s/p propranolol/steroids, TFTs/mood improving, on methimazole, f/u endo  # heart murmur: mild MR/AI on echo, normal LVEF, outpt f/u
Depression w/ intentional neurontin overdose, supportive care for neurontin overdose, constant observation, no standing psychotropic medications at this time, c/w Ativan prn agitation, plan for inpatient psych admission once medically stable   Thyrotoxicosis w/ possible thyroid storm, clinically improving, c/w Methimazole 20mg q6h, c/w Propranolol, monitor TFT's, Endo input appreciated   Tachycardia d/t thyrotoxicosis, HR improving, c/w Propranolol, telemetry monitoring

## 2021-09-27 PROCEDURE — 99233 SBSQ HOSP IP/OBS HIGH 50: CPT

## 2021-09-27 PROCEDURE — 99232 SBSQ HOSP IP/OBS MODERATE 35: CPT

## 2021-09-27 RX ADMIN — Medication 3 MILLIGRAM(S): at 20:51

## 2021-09-27 RX ADMIN — Medication 50 MILLIGRAM(S): at 10:16

## 2021-09-27 RX ADMIN — Medication 1 PATCH: at 10:11

## 2021-09-27 RX ADMIN — Medication 1 PATCH: at 05:08

## 2021-09-27 RX ADMIN — Medication 1 PATCH: at 10:20

## 2021-09-27 NOTE — PROGRESS NOTE ADULT - SUBJECTIVE AND OBJECTIVE BOX
Chief Complaint: f/u hyperthyroidism    History:  Patient seen at bedside this morning, states that she is feeling well. No palpitations. Tremor has improved. HR now in the 60s to 80s.    MEDICATIONS  (STANDING):  influenza   Vaccine 0.5 milliLiter(s) IntraMuscular once  melatonin 3 milliGRAM(s) Oral at bedtime  methimazole 40 milliGRAM(s) Oral daily  metoprolol tartrate 50 milliGRAM(s) Oral <User Schedule>  nicotine -   7 mG/24Hr(s) Patch 1 patch Transdermal daily  sodium chloride 0.9%. 1000 milliLiter(s) (125 mL/Hr) IV Continuous <Continuous>    MEDICATIONS  (PRN):  acetaminophen   Tablet .. 650 milliGRAM(s) Oral every 6 hours PRN Temp greater or equal to 38.5C (101.3F), Mild Pain (1 - 3)  aluminum hydroxide/magnesium hydroxide/simethicone Suspension 30 milliLiter(s) Oral every 4 hours PRN Dyspepsia  LORazepam     Tablet 0.5 milliGRAM(s) Oral every 6 hours PRN Anxiety  ondansetron Injectable 4 milliGRAM(s) IV Push every 8 hours PRN Nausea and/or Vomiting      Allergies    cherries - red spots/vomiting (Other)  penicillins (Other)  Zithromax (Unknown)    Review of Systems:  ALL OTHER SYSTEMS REVIEWED AND NEGATIVE    PHYSICAL EXAM:  VITALS: T(C): 36.7 (09-27-21 @ 10:10)  T(F): 98.1 (09-27-21 @ 10:10), Max: 98.5 (09-26-21 @ 18:01)  HR: 82 (09-27-21 @ 10:10) (59 - 98)  BP: 117/68 (09-27-21 @ 10:10) (113/63 - 130/65)  RR:  (18 - 19)  SpO2:  (95% - 98%)  Wt(kg): --  GENERAL: NAD, well-developed  EYES: +proptosis, anicteric  HEENT:  Atraumatic, Normocephalic  RESPIRATORY: + air movement bilaterally, no respiratory distress   PSYCH: Alert and oriented x 3, reactive affect        09-25    139  |  107  |  8   ----------------------------<  90  4.3   |  20<L>  |  0.35<L>    EGFR if : 180  EGFR if non : 156    Ca    9.5      09-25  Mg     1.60     09-25  Phos  2.5     09-25          Thyroid Function Tests:  09-25 @ 12:33 TSH -- FreeT4 -- T3 -- Anti TPO 2276.0 Anti Thyroglobulin Ab -- TSI --  09-23 @ 08:06 TSH -- FreeT4 4.4 T3 564 Anti TPO -- Anti Thyroglobulin Ab -- TSI --

## 2021-09-27 NOTE — PROGRESS NOTE ADULT - ASSESSMENT
21 y.o. Female w/ hx of hyperthyroidism, depression, PTSD? admitted for supportive care in setting of intentional gabapentin overdose, found to have possible thyrotoxicosis. TFTs improving, mood improving. Patient clear from a medical standpoint for transfer to [facility]. D/C 40 minutes.

## 2021-09-27 NOTE — BH CONSULTATION LIAISON PROGRESS NOTE - NSICDXBHSECONDARYDX_PSY_ALL_CORE
Mood disorder   F39  Post traumatic stress disorder (PTSD)   F43.10  
Mood disorder   F39  Post traumatic stress disorder (PTSD)   F43.10

## 2021-09-27 NOTE — PROGRESS NOTE ADULT - ASSESSMENT
21F w/ hx of depression, PTSD?, hyperthyroidism presented to the ED after ingestion of 85-90 tablets of 300 mg gabapentin. Endocrine consulted for hyperthyroidism.    #hyperthyroidism likely secondary to Graves dx, less likely toxic MNG or thyroiditis  Not in thyroid storm  - c/w Methimazole 40 mg daily   - c/w metoprolol 50 mg BID - HR now at goal   - clinical picture c/w Graves, f/u TSI ab to confirm  -  Discharge plan:  - Per primary team, patient is ready for transfer to [facility]. Can transfer to [facility] on Methimazole 40 mg daily and Metoprolol 50 mg BID.   - If patient is still inpatient at [facility] in 2 weeks or so, can repeat TSH/free T4/Total T3 and call back endocrine service with results  - Outpatient f/u with Endocrinology @ [facility] [address] [place] [address], [address], [place] [address] [phone]. Pt should have repeat TFTs in 6-8 weeks outpatient. May need f/u with PMD in interim (suggest referral to new PMD given pt/father unhappy with care of current PMD)    #Graves' opthalmopathy   - history and physical exam c/w Graves' eye disease   - discussed worsening of graves eye disease with tobacco use and counseled on smoking cessation  - outpatient f/u with Endocrinology as well as Optho    Galen Cota MD   Pager # [phone]  On evenings and weekends, please call the office at [phone] or page endocrine fellow on call. Please note that this patient may be followed by different provider tomorrow. If no answer, contact the office.    21F w/ hx of depression, PTSD?, hyperthyroidism presented to the ED after ingestion of 85-90 tablets of 300 mg gabapentin. Endocrine consulted for hyperthyroidism.    #hyperthyroidism likely secondary to Graves dx, less likely toxic MNG or thyroiditis  Not in thyroid storm  - c/w Methimazole 40 mg daily   - c/w metoprolol 50 mg BID - HR now at goal   - clinical picture c/w Graves, f/u TSI ab to confirm    Discharge plan:  - Per primary team, patient is ready for transfer to [facility]. Can transfer to [facility] on Methimazole 40 mg daily and Metoprolol 50 mg BID.   - If patient is still inpatient at [facility] in 2 weeks or so, can repeat TSH/free T4/Total T3 and call back endocrine service with results  - Outpatient f/u with Endocrinology @ [facility] [address] [place] [address], [address], [place] [address] [phone]. Pt should have repeat TFTs in 6-8 weeks outpatient. May need f/u with PMD in interim (suggest referral to new PMD given pt/father unhappy with care of current PMD)    #Graves' opthalmopathy   - history and physical exam c/w Graves' eye disease   - discussed worsening of graves eye disease with tobacco use and counseled on smoking cessation  - outpatient f/u with Endocrinology as well as Optho    Will sign off at this time. Please call back endocrine service with any questions.     Galen Cota MD   Pager # [phone]  On evenings and weekends, please call the office at [phone] or page endocrine fellow on call. Please note that this patient may be followed by different provider tomorrow. If no answer, contact the office.

## 2021-09-27 NOTE — PROGRESS NOTE ADULT - SUBJECTIVE AND OBJECTIVE BOX
Patient is a 21y old  Female who presents with a chief complaint of Gabapentin OD (27 Sep 2021 13:28)      SUBJECTIVE / OVERNIGHT EVENTS:  Patient has no new complaints. Denies cp, SOB, abdominal pain, N/V/D     MEDICATIONS  (STANDING):  influenza   Vaccine 0.5 milliLiter(s) IntraMuscular once  melatonin 3 milliGRAM(s) Oral at bedtime  methimazole 40 milliGRAM(s) Oral daily  metoprolol tartrate 50 milliGRAM(s) Oral <User Schedule>  nicotine -   7 mG/24Hr(s) Patch 1 patch Transdermal daily  sodium chloride 0.9%. 1000 milliLiter(s) (125 mL/Hr) IV Continuous <Continuous>    MEDICATIONS  (PRN):  acetaminophen   Tablet .. 650 milliGRAM(s) Oral every 6 hours PRN Temp greater or equal to 38.5C (101.3F), Mild Pain (1 - 3)  aluminum hydroxide/magnesium hydroxide/simethicone Suspension 30 milliLiter(s) Oral every 4 hours PRN Dyspepsia  LORazepam     Tablet 0.5 milliGRAM(s) Oral every 6 hours PRN Anxiety  ondansetron Injectable 4 milliGRAM(s) IV Push every 8 hours PRN Nausea and/or Vomiting      Vital Signs Last 24 Hrs  T(C): 36.7 (27 Sep 2021 10:10), Max: 36.9 (26 Sep 2021 18:01)  T(F): 98.1 (27 Sep 2021 10:10), Max: 98.5 (26 Sep 2021 18:01)  HR: 82 (27 Sep 2021 10:10) (59 - 98)  BP: 117/68 (27 Sep 2021 10:10) (113/63 - 130/65)  BP(mean): --  RR: 18 (27 Sep 2021 10:10) (18 - 19)  SpO2: 98% (27 Sep 2021 10:10) (95% - 98%)  CAPILLARY BLOOD GLUCOSE        I&O's Summary      PHYSICAL EXAM:  GENERAL: NAD, well-developed  HEAD:  Atraumatic, Normocephalic  EYES: EOMI, PERRLA, conjunctiva and sclera clear  NECK: Supple, + goiter No JVD  CHEST/LUNG: Clear to auscultation bilaterally; No wheeze  HEART: Regular rate and rhythm; No murmurs, rubs, or gallops  ABDOMEN: Soft, Nontender, Nondistended; Bowel sounds present  EXTREMITIES:  2+ Peripheral Pulses, No clubbing, cyanosis, or edema  PSYCH: AAOx3  NEUROLOGY: non-focal  SKIN: No rashes or lesions    LABS:                    RADIOLOGY & ADDITIONAL TESTS:    Imaging Personally Reviewed:  < from: Transthoracic Echocardiogram (09.26.21 @ 11:11) >  CONCLUSIONS:  1. Normal mitral valve. Mild mitral regurgitation.  2. Aortic valve leaflet morphology not well visualized.  Mild aortic regurgitation.  3. Normal left ventricular internal dimensions and wall  thicknesses.  4. Endocardium not well visualized; grossly normal left  ventricular systolic function.  5. Normal left ventricular diastolic function.  6. The right ventricle isnot well visualized; grossly  normal right ventricular systolic function.    < end of copied text >      Consultant(s) Notes Reviewed:      Care Discussed with Consultants/Other Providers:

## 2021-09-28 ENCOUNTER — TRANSCRIPTION ENCOUNTER (OUTPATIENT)
Age: 21
End: 2021-09-28

## 2021-09-28 ENCOUNTER — INPATIENT (INPATIENT)
Facility: HOSPITAL | Age: 21
LOS: 8 days | Discharge: HOME | End: 2021-10-07
Attending: PSYCHIATRY & NEUROLOGY | Admitting: STUDENT IN AN ORGANIZED HEALTH CARE EDUCATION/TRAINING PROGRAM
Payer: MEDICAID

## 2021-09-28 VITALS
TEMPERATURE: 98 F | RESPIRATION RATE: 18 BRPM | DIASTOLIC BLOOD PRESSURE: 65 MMHG | OXYGEN SATURATION: 100 % | HEART RATE: 69 BPM | SYSTOLIC BLOOD PRESSURE: 121 MMHG

## 2021-09-28 VITALS — RESPIRATION RATE: 17 BRPM | TEMPERATURE: 98 F | HEIGHT: 66 IN | WEIGHT: 195.11 LBS

## 2021-09-28 DIAGNOSIS — F33.9 MAJOR DEPRESSIVE DISORDER, RECURRENT, UNSPECIFIED: ICD-10-CM

## 2021-09-28 LAB
SARS-COV-2 RNA SPEC QL NAA+PROBE: SIGNIFICANT CHANGE UP
TSI ACT/NOR SER: 31.7 IU/L — HIGH (ref 0–0.55)

## 2021-09-28 PROCEDURE — 99233 SBSQ HOSP IP/OBS HIGH 50: CPT

## 2021-09-28 PROCEDURE — 99239 HOSP IP/OBS DSCHRG MGMT >30: CPT

## 2021-09-28 RX ORDER — METOPROLOL TARTRATE 50 MG
50 TABLET ORAL
Refills: 0 | Status: DISCONTINUED | OUTPATIENT
Start: 2021-09-28 | End: 2021-10-04

## 2021-09-28 RX ORDER — METHIMAZOLE 10 MG/1
4 TABLET ORAL
Qty: 0 | Refills: 0 | DISCHARGE
Start: 2021-09-28

## 2021-09-28 RX ORDER — LANOLIN ALCOHOL/MO/W.PET/CERES
1 CREAM (GRAM) TOPICAL
Qty: 0 | Refills: 0 | DISCHARGE
Start: 2021-09-28

## 2021-09-28 RX ORDER — LANOLIN ALCOHOL/MO/W.PET/CERES
3 CREAM (GRAM) TOPICAL AT BEDTIME
Refills: 0 | Status: DISCONTINUED | OUTPATIENT
Start: 2021-09-28 | End: 2021-10-07

## 2021-09-28 RX ORDER — NICOTINE POLACRILEX 2 MG
1 GUM BUCCAL
Qty: 0 | Refills: 0 | DISCHARGE
Start: 2021-09-28

## 2021-09-28 RX ORDER — METOPROLOL TARTRATE 50 MG
1 TABLET ORAL
Qty: 0 | Refills: 0 | DISCHARGE
Start: 2021-09-28

## 2021-09-28 RX ADMIN — Medication 1 PATCH: at 15:02

## 2021-09-28 RX ADMIN — Medication 1 PATCH: at 11:08

## 2021-09-28 RX ADMIN — Medication 3 MILLIGRAM(S): at 22:34

## 2021-09-28 RX ADMIN — Medication 1 PATCH: at 08:59

## 2021-09-28 RX ADMIN — Medication 50 MILLIGRAM(S): at 11:03

## 2021-09-28 RX ADMIN — Medication 50 MILLIGRAM(S): at 22:34

## 2021-09-28 NOTE — DISCHARGE NOTE NURSING/CASE MANAGEMENT/SOCIAL WORK - NSDCFUADDAPPT_GEN_ALL_CORE_FT
Please follow up with Endocrinology @ [facility] [address], [address], [place] [address] [phone] once discharged from [facility].  Follow up with PCP.  Follow up with Psychiatry.

## 2021-09-28 NOTE — DISCHARGE NOTE PROVIDER - CARE PROVIDER_API CALL
OBED NÚÑEZ   Pediatrics  [address] [place] [id number]  Phone: ()-  Fax: ()-  Follow Up Time:

## 2021-09-28 NOTE — PROGRESS NOTE ADULT - ASSESSMENT
21 y.o. Female w/ hx of hyperthyroidism, depression, PTSD? admitted for supportive care in setting of intentional gabapentin overdose, found to have possible thyrotoxicosis. TFTs improving, mood improving. Patient clear from a medical standpoint for transfer to [facility]. D/C 40 minutes.

## 2021-09-28 NOTE — PROGRESS NOTE ADULT - PROBLEM SELECTOR PLAN 3
Pt on seroquel at home. Anxious, teary, and withdrawn during interview. States she does not see a psychiatrist as an outpatient but was previously at [facility]. States she does not like [facility] and would like to go to [facility].     Ativan q6 PRN for anxiety    Psychiatry following.
Pt on seroquel at home.   States she does not see a psychiatrist as an outpatient but was previously at [facility].   [facility]    [facility]
Pt on seroquel at home. Anxious, teary, and withdrawn during interview. States she does not see a psychiatrist as an outpatient but was previously at [facility]. States she does not like [facility] and would like to go to [facility].     Ativan q6 PRN for anxiety    Psychiatry following.
Pt on seroquel at home. Anxious, teary, and withdrawn during interview. States she does not see a psychiatrist as an outpatient but was previously at [facility]. States she does not like [facility] and would like to go to [facility].     Ativan q6 PRN for anxiety    Psychiatry following.
Pt on seroquel at home.   States she does not see a psychiatrist as an outpatient but was previously at [facility].   she is agreeable [facility]    [facility]

## 2021-09-28 NOTE — PROGRESS NOTE ADULT - PROBLEM SELECTOR PROBLEM 2
Hyperthyroidism
Hyperthyroidism
Thyroid ophthalmopathy
Hyperthyroidism

## 2021-09-28 NOTE — BH CONSULTATION LIAISON PROGRESS NOTE - NSBHCHARTREVIEWVS_PSY_A_CORE FT
Vital Signs Last 24 Hrs  T(C): 36.9 (28 Sep 2021 09:07), Max: 36.9 (27 Sep 2021 18:00)  T(F): 98.4 (28 Sep 2021 09:07), Max: 98.5 (27 Sep 2021 18:00)  HR: 68 (28 Sep 2021 09:07) (58 - 74)  BP: 134/70 (28 Sep 2021 09:07) (114/56 - 134/70)  BP(mean): --  RR: 18 (28 Sep 2021 09:07) (18 - 18)  SpO2: 100% (28 Sep 2021 09:07) (96% - 100%)
Vital Signs Last 24 Hrs  T(C): 36.8 (25 Sep 2021 06:00), Max: 37 (24 Sep 2021 18:14)  T(F): 98.3 (25 Sep 2021 06:00), Max: 98.6 (24 Sep 2021 18:14)  HR: 85 (25 Sep 2021 06:00) (78 - 95)  BP: 137/75 (25 Sep 2021 06:00) (111/48 - 145/86)  BP(mean): --  RR: 18 (25 Sep 2021 06:00) (18 - 18)  SpO2: 97% (25 Sep 2021 06:00) (97% - 100%)
Vital Signs Last 24 Hrs  T(C): 36.7 (27 Sep 2021 10:10), Max: 36.9 (26 Sep 2021 18:01)  T(F): 98.1 (27 Sep 2021 10:10), Max: 98.5 (26 Sep 2021 18:01)  HR: 82 (27 Sep 2021 10:10) (59 - 98)  BP: 117/68 (27 Sep 2021 10:10) (113/63 - 130/65)  BP(mean): --  RR: 18 (27 Sep 2021 10:10) (18 - 19)  SpO2: 98% (27 Sep 2021 10:10) (95% - 98%)
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
## 2021-09-28 NOTE — PROGRESS NOTE ADULT - PROBLEM SELECTOR PROBLEM 1
Hyperthyroidism
Gabapentin overdose

## 2021-09-28 NOTE — DISCHARGE NOTE NURSING/CASE MANAGEMENT/SOCIAL WORK - PATIENT PORTAL LINK FT
You can access the FollowMyHealth Patient Portal offered by [facility] by registering at the following website: [email or web address]. By joining [employer]’s FollowMyHealth portal, you will also be able to view your health information using other applications (apps) compatible with our system.

## 2021-09-28 NOTE — PROGRESS NOTE ADULT - PROBLEM SELECTOR PLAN 1
Pt p/w nausea, dizziness, abd pain, tachycardia, tachypnea in setting of toxic ingestion.  Symptoms now resolved.  Tolerating diet  No current SI  patient from transfer to [facility]
Pt w/ nausea, dizziness, abd pain, tachycardia, tachypnea in setting of toxic ingestion.    Will provide supportive care as necessary. IV fluids, Zofran for nausea, and treat and symptoms.
Pt p/w nausea, dizziness, abd pain, tachycardia, tachypnea in setting of toxic ingestion.  Symptoms now resolved.  Tolerating diet  No current SI  patient from transfer to [facility]

## 2021-09-28 NOTE — BH CONSULTATION LIAISON PROGRESS NOTE - CURRENT MEDICATION
MEDICATIONS  (STANDING):  influenza   Vaccine 0.5 milliLiter(s) IntraMuscular once  melatonin 3 milliGRAM(s) Oral at bedtime  methimazole 40 milliGRAM(s) Oral daily  metoprolol tartrate 50 milliGRAM(s) Oral <User Schedule>  nicotine -   7 mG/24Hr(s) Patch 1 patch Transdermal daily  sodium chloride 0.9%. 1000 milliLiter(s) (125 mL/Hr) IV Continuous <Continuous>    MEDICATIONS  (PRN):  acetaminophen   Tablet .. 650 milliGRAM(s) Oral every 6 hours PRN Temp greater or equal to 38.5C (101.3F), Mild Pain (1 - 3)  aluminum hydroxide/magnesium hydroxide/simethicone Suspension 30 milliLiter(s) Oral every 4 hours PRN Dyspepsia  LORazepam     Tablet 0.5 milliGRAM(s) Oral every 6 hours PRN Anxiety  ondansetron Injectable 4 milliGRAM(s) IV Push every 8 hours PRN Nausea and/or Vomiting  
MEDICATIONS  (STANDING):  influenza   Vaccine 0.5 milliLiter(s) IntraMuscular once  melatonin 3 milliGRAM(s) Oral at bedtime  methimazole 40 milliGRAM(s) Oral daily  metoprolol tartrate 50 milliGRAM(s) Oral <User Schedule>  nicotine -   7 mG/24Hr(s) Patch 1 patch Transdermal daily  sodium chloride 0.9%. 1000 milliLiter(s) (125 mL/Hr) IV Continuous <Continuous>    MEDICATIONS  (PRN):  acetaminophen   Tablet .. 650 milliGRAM(s) Oral every 6 hours PRN Temp greater or equal to 38.5C (101.3F), Mild Pain (1 - 3)  aluminum hydroxide/magnesium hydroxide/simethicone Suspension 30 milliLiter(s) Oral every 4 hours PRN Dyspepsia  LORazepam     Tablet 0.5 milliGRAM(s) Oral every 6 hours PRN Anxiety  ondansetron Injectable 4 milliGRAM(s) IV Push every 8 hours PRN Nausea and/or Vomiting  
MEDICATIONS  (STANDING):  influenza   Vaccine 0.5 milliLiter(s) IntraMuscular once  methimazole 40 milliGRAM(s) Oral daily  metoprolol tartrate 50 milliGRAM(s) Oral <User Schedule>  nicotine -   7 mG/24Hr(s) Patch 1 patch Transdermal daily  sodium chloride 0.9%. 1000 milliLiter(s) (125 mL/Hr) IV Continuous <Continuous>    MEDICATIONS  (PRN):  acetaminophen   Tablet .. 650 milliGRAM(s) Oral every 6 hours PRN Temp greater or equal to 38.5C (101.3F), Mild Pain (1 - 3)  aluminum hydroxide/magnesium hydroxide/simethicone Suspension 30 milliLiter(s) Oral every 4 hours PRN Dyspepsia  LORazepam     Tablet 0.5 milliGRAM(s) Oral every 6 hours PRN Anxiety  melatonin 3 milliGRAM(s) Oral at bedtime PRN Insomnia  ondansetron Injectable 4 milliGRAM(s) IV Push every 8 hours PRN Nausea and/or Vomiting

## 2021-09-28 NOTE — BH PATIENT PROFILE - HOME MEDICATIONS
nicotine 7 mg/24 hr transdermal film, extended release , 1 patch transdermal once a day  metoprolol tartrate 50 mg oral tablet , 1 tab(s) orally 2 times a day  melatonin 3 mg oral tablet , 1 tab(s) orally once a day (at bedtime)  aluminum hydroxide-magnesium hydroxide 200 mg-200 mg/5 mL oral suspension , 30 milliliter(s) orally every 4 hours, As needed, Dyspepsia  methIMAzole 10 mg oral tablet , 4 tab(s) orally once a day

## 2021-09-28 NOTE — BH CONSULTATION LIAISON PROGRESS NOTE - MSE UNSTRUCTURED FT
Pt seen briefly; medically cleared. Sitting calmly, without distress, and no focal neuro deficits. AA O x 3. Understands she is headed to inpatient psych when bed available. Discussed w/ [facility] and currently no beds identified, on wait list.
Mental Status Exam:  Adriana: well groomed, fair hygiene     Behavior: calm, cooperative, no psychomotor retardation/agitation  Motor: no tremors, EPS, or rigidity  Gait: did not assess, pt in bed  Speech: normal rate, rhythm, prosody and volume   Mood: "okay"  Affect: anxious, congruent  Thought process: clear, goal directed   Thought Content: denies paranoia, delusions   Perception: denies AH/VH  SI: currently denies  HI: denies  Insight: fair  Judgment: fair    Cognitive Exam:  Orientation: AOx3  Recall: intact  Attention: intact  Abstraction: intact

## 2021-09-28 NOTE — PROGRESS NOTE ADULT - SUBJECTIVE AND OBJECTIVE BOX
Patient is a 21y old  Female who presents with a chief complaint of Gabapentin OD (27 Sep 2021 13:47)      SUBJECTIVE / OVERNIGHT EVENTS:  Patient has no new complaints. Denies cp, SOB, abdominal pain, N/V/D     MEDICATIONS  (STANDING):  influenza   Vaccine 0.5 milliLiter(s) IntraMuscular once  melatonin 3 milliGRAM(s) Oral at bedtime  methimazole 40 milliGRAM(s) Oral daily  metoprolol tartrate 50 milliGRAM(s) Oral <User Schedule>  nicotine -   7 mG/24Hr(s) Patch 1 patch Transdermal daily  sodium chloride 0.9%. 1000 milliLiter(s) (125 mL/Hr) IV Continuous <Continuous>    MEDICATIONS  (PRN):  acetaminophen   Tablet .. 650 milliGRAM(s) Oral every 6 hours PRN Temp greater or equal to 38.5C (101.3F), Mild Pain (1 - 3)  aluminum hydroxide/magnesium hydroxide/simethicone Suspension 30 milliLiter(s) Oral every 4 hours PRN Dyspepsia  LORazepam     Tablet 0.5 milliGRAM(s) Oral every 6 hours PRN Anxiety  ondansetron Injectable 4 milliGRAM(s) IV Push every 8 hours PRN Nausea and/or Vomiting      Vital Signs Last 24 Hrs  T(C): 37.1 (28 Sep 2021 11:00), Max: 37.1 (28 Sep 2021 11:00)  T(F): 98.7 (28 Sep 2021 11:00), Max: 98.7 (28 Sep 2021 11:00)  HR: 71 (28 Sep 2021 11:00) (58 - 74)  BP: 129/68 (28 Sep 2021 11:00) (114/56 - 134/70)  BP(mean): --  RR: 18 (28 Sep 2021 11:00) (18 - 18)  SpO2: 100% (28 Sep 2021 11:00) (96% - 100%)  CAPILLARY BLOOD GLUCOSE        I&O's Summary    27 Sep 2021 07:01  -  28 Sep 2021 07:00  --------------------------------------------------------  IN: 125 mL / OUT: 0 mL / NET: 125 mL        PHYSICAL EXAM:  GENERAL: NAD, well-developed  HEAD:  Atraumatic, Normocephalic  EYES: EOMI, PERRLA, conjunctiva and sclera clear  NECK: Supple, No JVD  CHEST/LUNG: Clear to auscultation bilaterally; No wheeze  HEART: Regular rate and rhythm; No murmurs, rubs, or gallops  ABDOMEN: Soft, Nontender, Nondistended; Bowel sounds present  EXTREMITIES:  2+ Peripheral Pulses, No clubbing, cyanosis, or edema  PSYCH: AAOx3  NEUROLOGY: non-focal  SKIN: No rashes or lesions    LABS:                    RADIOLOGY & ADDITIONAL TESTS:    Imaging Personally Reviewed:    Consultant(s) Notes Reviewed:      Care Discussed with Consultants/Other Providers:

## 2021-09-28 NOTE — DISCHARGE NOTE PROVIDER - HOSPITAL COURSE
21y F PMhx hyperthyroidism, depression, PTSD? admitted for supportive care in setting of intentional gabapentin overdose, found to have possible thyrotoxicosis. TFTs improving, mood improving    Hospital course:  Gabapentin overdose- nausea, dizziness, abd pain, tachycardia, tachypnea in setting of toxic ingestion. S/p IV Fluids, Zofran for nausea. Symptoms resolved    Hyperthyroidism / Thyrotoxicosis- Elevated serum t4, t3, low serum TSH. Sinus tachycardia. Tremulous on exam. Secondary to graves disease c/b graves opthalmopathy. C/w methimazole 40mg qd and metoprolol 50mg bid. Propranolol DC'ed. Endocrinology cleared patient for transfer to [facility]. Pt will get repeat TFTs in 2 weeks. Outpatient f/u with Endocrinology @ [facility] [address] [place] [address], [address], [place] [address] [phone]    Depression- on seroquel at home. Anxious, teary, and withdrawn during interview. States she does not see a psychiatrist as an outpatient but was previously at [facility]. Psychiatry following- Ativan PRN anxiety. Agrees for [facility] admission    Cardiac murmur- mild MR/AI on echo, normal LVEF, outpt f/u    Case discussed with Dr. Young on 9/28, pt medically stable for transfer to [facility].

## 2021-09-28 NOTE — DISCHARGE NOTE PROVIDER - NSDCCPCAREPLAN_GEN_ALL_CORE_FT
PRINCIPAL DISCHARGE DIAGNOSIS  Diagnosis: Gabapentin overdose, intentional self-harm, initial encounter  Assessment and Plan of Treatment: You were admitted with intentional overdose of Gabapentin. YOu were treated with supportive care including IV Fluids and Zofran. Your symptoms resolved. You are being transferred to [facility] for suicidal attempt.  Follow up with psych crisis clinic as needed after discharge from [facility].      SECONDARY DISCHARGE DIAGNOSES  Diagnosis: Hyperthyroidism  Assessment and Plan of Treatment: You were admitted with thyrotoxicosis (exacerbation of your hyperthyroidism). Your thyroid function tests were abnormal therefore you need to have these repeated in 2 weeks while at [facility]. You were seen by Endocrinology who recommended stopping Propranolol and instead started Metoprolol 50mg twice a day. Please follow up with Endocrinology @ [facility] [address], [address], [place] [address] [phone] once discharged from [facility].    Diagnosis: Depression  Assessment and Plan of Treatment: You are being transferred to [facility] for suicidal attempt.  Follow up with psych crisis clinic as needed after discharge from [facility].    Diagnosis: Cardiac murmur  Assessment and Plan of Treatment: You had an echo that showed mild mitral regurgitation and mild aortic regurgitation (heart murmurs). This can be monitored with yearly echos. Follow up with your PCP.

## 2021-09-28 NOTE — DISCHARGE NOTE PROVIDER - NSDCMRMEDTOKEN_GEN_ALL_CORE_FT
aluminum hydroxide-magnesium hydroxide 200 mg-200 mg/5 mL oral suspension: 30 milliliter(s) orally every 4 hours, As needed, Dyspepsia  melatonin 3 mg oral tablet: 1 tab(s) orally once a day (at bedtime)  methIMAzole 10 mg oral tablet: 4 tab(s) orally once a day  metoprolol tartrate 50 mg oral tablet: 1 tab(s) orally 2 times a day  nicotine 7 mg/24 hr transdermal film, extended release: 1 patch transdermal once a day
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
## 2021-09-28 NOTE — PROGRESS NOTE ADULT - PROBLEM SELECTOR PLAN 4
loud LUSB murmur, unknown to pt   f/u tte
TTE on 9/26 showed mild mitral regurg and otherwise WNL
TTE on 9/26 showed mild mitral regurg and otherwise WNL

## 2021-09-28 NOTE — DISCHARGE NOTE PROVIDER - NSDCFUADDAPPT_GEN_ALL_CORE_FT
Please follow up with Endocrinology @ [facility] [address], [address], [place] [address] [phone] once discharged from [facility].  Follow up with PCP.  Follow up with Psychiatry.

## 2021-09-28 NOTE — PROGRESS NOTE ADULT - PROBLEM SELECTOR PLAN 2
Elevated serum t4, t3, low serum TSH. Sinus tachycardia. Tremulous on exam.   -secondary to graves disease c/b graves opthalmopathy  -c/w methimazole 40mg qd and metoprolol 50mg bid  -HR now at goal  -F/U TSI ab  Endocrinology cleared patient for transfer to [facility]  -if patient still at [facility] in 2 weeks , repeat TSH/free t4/ total T3 and call endo.
Elevated serum t4, t3, low serum TSH. Sinus tachycardia. Tremulous on exam.     Confirmed with pt today that she has been taking methimazole as prescribed. Her father was unaware of this as she keeps this medicine for herself and the psych meds are "locked in a safe"    Pt on methimazole q8 and propranolol q8    Endo will see her today.
Elevated serum t4, t3, low serum TSH. Sinus tachycardia. Tremulous on exam.     Confirmed with pt today that she has been taking methimazole as prescribed. Her father was unaware of this as she keeps this medicine for herself and the psych meds are "locked in a safe"    Pt on methimazole qd and propranolol DC'ed per endo.
Elevated serum t4, t3, low serum TSH. Sinus tachycardia. Tremulous on exam.     Confirmed with pt today that she has been taking methimazole as prescribed. Her father was unaware of this as she keeps this medicine for herself and the psych meds are "locked in a safe"    Pt on methimazole qd and propranolol DC'ed per endo.
Elevated serum t4, t3, low serum TSH. Sinus tachycardia. Tremulous on exam.   -secondary to graves disease c/b graves opthalmopathy  -c/w methimazole 40mg qd and metoprolol 50mg bid  -HR now at goal  -F/U TSI ab  Endocrinology cleared patient for transfer to [facility]  -if patient still at [facility] in 2 weeks , repeat TSH/free t4/ total T3 and call endo.

## 2021-09-28 NOTE — DISCHARGE NOTE PROVIDER - NSFOLLOWUPCLINICS_GEN_ALL_ED_FT
[facility]  Endocrinology  [address] [place] [address]  Phone: [phone]  Fax:     [facility]  Psychiatry  [phone] [place] [address]  Phone: [phone]  Fax:

## 2021-09-28 NOTE — BH CONSULTATION LIAISON PROGRESS NOTE - NSBHFUPINTERVALHXFT_PSY_A_CORE
Chart reviewed. Patient seen this AM. States that she is starting to feel a little better. Currently denies SI. Has not given thought about how she can improve her coping, but is also more hopeful that she will get some benefit from inpatient psychiatry. No AH/VH elicited.
Chart reviewed. Pt seen briefly; medically cleared. Sitting calmly, without distress, and no focal neuro deficits. AA O x 3. Understands she is headed to inpatient psych when bed available. Discussed w/ [facility] and currently no beds identified, on wait list.
Patient seen and evaluated, staff consulted, chart, labs, meds reviewed. Reports no issues overnight. Notes that she is feeling a little better having spoken with her father about inpatient admission. Reports poor sleep without home sleeping medication but reports fair energy in spite of this, improved appetite. Denies anxiety. Denies any current SI. Reports that she feels "like an idiot" for SA and is looking forward to getting better. Denies any medication side effects.

## 2021-09-29 DIAGNOSIS — E05.90 THYROTOXICOSIS, UNSPECIFIED WITHOUT THYROTOXIC CRISIS OR STORM: ICD-10-CM

## 2021-09-29 DIAGNOSIS — F43.10 POST-TRAUMATIC STRESS DISORDER, UNSPECIFIED: ICD-10-CM

## 2021-09-29 LAB
COVID-19 SPIKE DOMAIN AB INTERP: POSITIVE
COVID-19 SPIKE DOMAIN ANTIBODY RESULT: >250 U/ML — HIGH
SARS-COV-2 IGG+IGM SERPL QL IA: >250 U/ML — HIGH
SARS-COV-2 IGG+IGM SERPL QL IA: POSITIVE

## 2021-09-29 PROCEDURE — 90832 PSYTX W PT 30 MINUTES: CPT

## 2021-09-29 PROCEDURE — 99253 IP/OBS CNSLTJ NEW/EST LOW 45: CPT

## 2021-09-29 PROCEDURE — 99222 1ST HOSP IP/OBS MODERATE 55: CPT

## 2021-09-29 RX ORDER — NICOTINE POLACRILEX 2 MG
1 GUM BUCCAL DAILY
Refills: 0 | Status: DISCONTINUED | OUTPATIENT
Start: 2021-09-29 | End: 2021-10-07

## 2021-09-29 RX ORDER — ESCITALOPRAM OXALATE 10 MG/1
10 TABLET, FILM COATED ORAL DAILY
Refills: 0 | Status: DISCONTINUED | OUTPATIENT
Start: 2021-09-30 | End: 2021-10-02

## 2021-09-29 RX ORDER — ACETAMINOPHEN 500 MG
650 TABLET ORAL ONCE
Refills: 0 | Status: COMPLETED | OUTPATIENT
Start: 2021-09-29 | End: 2021-09-29

## 2021-09-29 RX ORDER — NICOTINE POLACRILEX 2 MG
2 GUM BUCCAL
Refills: 0 | Status: DISCONTINUED | OUTPATIENT
Start: 2021-09-29 | End: 2021-10-07

## 2021-09-29 RX ORDER — ESCITALOPRAM OXALATE 10 MG/1
5 TABLET, FILM COATED ORAL ONCE
Refills: 0 | Status: DISCONTINUED | OUTPATIENT
Start: 2021-09-29 | End: 2021-10-02

## 2021-09-29 RX ORDER — TRAZODONE HCL 50 MG
50 TABLET ORAL AT BEDTIME
Refills: 0 | Status: DISCONTINUED | OUTPATIENT
Start: 2021-09-29 | End: 2021-10-07

## 2021-09-29 RX ADMIN — Medication 650 MILLIGRAM(S): at 23:28

## 2021-09-29 RX ADMIN — Medication 3 MILLIGRAM(S): at 21:52

## 2021-09-29 RX ADMIN — Medication 50 MILLIGRAM(S): at 21:52

## 2021-09-29 RX ADMIN — Medication 50 MILLIGRAM(S): at 21:51

## 2021-09-29 RX ADMIN — Medication 50 MILLIGRAM(S): at 08:48

## 2021-09-29 NOTE — BH INPATIENT PSYCHIATRY ASSESSMENT NOTE - NSBHASSESSSUMMFT_PSY_ALL_CORE
The patient presents with worsening depression, s/p suicide attempt via overdose.  The patient continues to have passive SI, but denies any active SI on the unit.  She is happy to be receiving help.  She is agreeable to restart medications.  -Start Lexapro 5mg today, then increase to 10mg  -Start Trazodone 50mg hs for insomnia with plan to increase as needed  -Continue Methimazole and metoprolol for hyperthyroid.  Medicine consult appreciated.  -Group and individual therapy

## 2021-09-29 NOTE — BH INPATIENT PSYCHIATRY ASSESSMENT NOTE - NSCOMMENTSUICRISKFACT_PSY_ALL_CORE
Patient with high risk for suicide, with worsening mood, significant stressors, and recent suicide attempt.

## 2021-09-29 NOTE — CONSULT NOTE ADULT - SUBJECTIVE AND OBJECTIVE BOX
Pt seen and examined with ABHI Devlin   HPI: 24 y.o. F with h/o hyperthyroidism, depression, admitted at [facility] for SA/OD with Gabapentin and thyroid toxicosis. Improved on Methanethiol and Metoprolol. Pt transferred to [facility] for further management of depression. Currently, pt has no complaints, denies any heat intolerance, palpitation, tremors. Pt is calm, ambulating         PAST MEDICAL & SURGICAL HISTORY:  Depression    Hyperthyroidism    No significant past surgical history        Review of Systems:   CONSTITUTIONAL: No fever, weight loss, or fatigue  EYES: No eye pain, visual disturbances, or discharge  ENMT:  No difficulty hearing, tinnitus, vertigo; No sinus or throat pain  NECK: No pain or stiffness  RESPIRATORY: No cough, wheezing, chills or hemoptysis; No shortness of breath  CARDIOVASCULAR: No chest pain, palpitations, dizziness, or leg swelling  GASTROINTESTINAL: No abdominal or epigastric pain. No nausea, vomiting, or hematemesis; No diarrhea or constipation. No melena or hematochezia.  GENITOURINARY: No dysuria, frequency, hematuria, or incontinence  NEUROLOGICAL: No headaches, memory loss, loss of strength, numbness, or tremors  SKIN: No itching, burning, rashes, or lesions   LYMPH NODES: No enlarged glands  ENDOCRINE: No heat or cold intolerance; No hair loss  MUSCULOSKELETAL: No joint pain or swelling; No muscle, back, or extremity pain  HEME/LYMPH: No easy bruising, or bleeding gums  ALLERY AND IMMUNOLOGIC: No hives or eczema    Allergies    cherries - red spots/vomiting (Other)  penicillins (Other)  Zithromax (Unknown)    Intolerances        Social History: (+) Tobacco abuse: 1PPD , denies any alcohol of illicit drug abuse     FAMILY HISTORY:  No pertinent family history in first degree relatives, Grand mom had Grave ds         MEDICATIONS  (STANDING):  melatonin. 3 milliGRAM(s) Oral at bedtime  methimazole 40 milliGRAM(s) Oral daily  metoprolol tartrate 50 milliGRAM(s) Oral two times a day    MEDICATIONS  (PRN):  aluminum hydroxide/magnesium hydroxide/simethicone Suspension 30 milliLiter(s) Oral every 4 hours PRN Dyspepsia  LORazepam     Tablet 0.5 milliGRAM(s) Oral every 6 hours PRN anxiety  LORazepam   Injectable 2 milliGRAM(s) IntraMuscular once PRN agitation      Vital Signs Last 24 Hrs  T(C): 36.7 (29 Sep 2021 06:04), Max: 37.1 (28 Sep 2021 11:00)  T(F): 98.1 (29 Sep 2021 06:04), Max: 98.7 (28 Sep 2021 11:00)  HR: 78 (28 Sep 2021 22:25) (69 - 78)  BP: 132/79 (28 Sep 2021 22:25) (121/65 - 132/79)  BP(mean): --  RR: 17 (28 Sep 2021 17:07) (17 - 18)  SpO2: 96% (28 Sep 2021 22:25) (96% - 100%)  CAPILLARY BLOOD GLUCOSE            PHYSICAL EXAM:  GENERAL: NAD, well-developed  HEAD:  Atraumatic, Normocephalic  EYES: EOMI, conjunctiva and sclera clear  NECK: Supple, No JVD  CHEST/LUNG: Clear to auscultation bilaterally; No wheeze  HEART: Regular rate and rhythm; (+) 2/6 systolic murmurs at LSB, no rubs, or gallops  ABDOMEN: Soft, Nontender, Nondistended; Bowel sounds present  EXTREMITIES:  2+ Peripheral Pulses, No clubbing, cyanosis, or edema  NEUROLOGY: non-focal  SKIN: No rashes or lesions    LABS:    Complete Blood Count (09.25.21 @ 06:46)   Nucleated RBC: 0 /100 WBCs   WBC Count: 6.72 K/uL   RBC Count: 5.13 M/uL   Hemoglobin: 11.3 g/dL   Hematocrit: 36.6 %   Mean Cell Volume: 71.3 fL   Mean Cell Hemoglobin: 22.0 pg   Mean Cell Hemoglobin Conc: 30.9 gm/dL   Red Cell Distrib Width: 13.9 %   Platelet Count - Automated: 200 K/uL   Nucleated RBC #: 0.00 K/uL Thyroid Stimulating Immunoglobulin (09.25.21 @ 19:18)   Thyroid Stim. Immunoglobulin: 31.70: Performed At:  [facility] [id number]   García Hodgson MD Ph:[phone] IU/L Comprehensive Metabolic Panel (09.24.21 @ 07:55)   Sodium, Serum: 140 mmol/L   Potassium, Serum: 4.0 mmol/L   Chloride, Serum: 109 mmol/L   Carbon Dioxide, Serum: 22 mmol/L   Anion Gap, Serum: 9 mmol/L   Blood Urea Nitrogen, Serum: 10 mg/dL   Creatinine, Serum: 0.36 mg/dL   Glucose, Serum: 80 mg/dL   Calcium, Total Serum: 9.2 mg/dL   Protein Total, Serum: 6.4 g/dL   Albumin, Serum: 3.7 g/dL   Bilirubin Total, Serum: 0.4 mg/dL   Alkaline Phosphatase, Serum: 114 U/L   Aspartate Aminotransferase (AST/SGOT): 26 U/L   Alanine Aminotransferase (ALT/SGPT): 19 U/L   eGFR if Non : 154: The units for eGFR are ml/min/1.73m2 (normalized body surface area). The   eGFR is calculated from a serum creatinine using the CKD-EPI equation.   Other variables required for calculation are race, age and sex. Among   patients with chronic kidney disease (CKD), the eGFR is useful in   determining the stage of disease according to KDOQI CKD classification.   All eGFR results are reported numerically with the following   interpretation. Thyroid Stimulating Hormone, Serum (09.23.21 @ 08:02)   Thyroid Stimulating Hormone, Serum: <0.10 uIU/mL Free Thyroxine, Serum (09.23.21 @ 08:06)   Free Thyroxine, Serum: 4.4 ng/dL < from: Transthoracic Echocardiogram (09.26.21 @ 11:11) >  ------------------------------------------------------------------------  CONCLUSIONS:  1. Normal mitral valve. Mild mitral regurgitation.  2. Aortic valve leaflet morphology not well visualized.  Mild aortic regurgitation.  3. Normal left ventricular internal dimensions and wall  thicknesses.  4. Endocardium not well visualized; grossly normal left  ventricular systolic function.  5. Normal left ventricular diastolic function.  6. The right ventricle isnot well visualized; grossly  normal right ventricular systolic function.  ------------------------------------------------------------------------  Confirmed on  9/26/2021 - 12:26:33 by Woo Sanford M.D.,  [facility], [facility]  ---------------------    < end of copied text >                  EKG(personally reviewed): < from: 12 Lead ECG (09.23.21 @ 07:13) >    Ventricular Rate 134 BPM    Atrial Rate 134 BPM    P-R Interval 146 ms    QRS Duration 78 ms    Q-T Interval 276 ms    QTC Calculation(Bazett) 412 ms    P Axis 35 degrees    R Axis 1 degrees    T Axis 15 degrees    Diagnosis Line Sinus tachycardia  Voltage criteria for left ventricular hypertrophy  Nonspecific ST abnormality  Abnormal ECG    < end of copied text >    RADIOLOGY & ADDITIONAL TESTS:    Imaging Personally Reviewed:    Consultant(s) Notes Reviewed:  Endo     Care Discussed with Consultants/Other Providers:

## 2021-09-29 NOTE — BH INPATIENT PSYCHIATRY ASSESSMENT NOTE - CURRENT MEDICATION
MEDICATIONS  (STANDING):  escitalopram 5 milliGRAM(s) Oral once  melatonin. 3 milliGRAM(s) Oral at bedtime  methimazole 40 milliGRAM(s) Oral daily  metoprolol tartrate 50 milliGRAM(s) Oral two times a day  nicotine - 21 mG/24Hr(s) Patch 1 patch Transdermal daily  traZODone 50 milliGRAM(s) Oral at bedtime    MEDICATIONS  (PRN):  aluminum hydroxide/magnesium hydroxide/simethicone Suspension 30 milliLiter(s) Oral every 4 hours PRN Dyspepsia  LORazepam     Tablet 0.5 milliGRAM(s) Oral every 6 hours PRN anxiety  LORazepam   Injectable 2 milliGRAM(s) IntraMuscular once PRN agitation  nicotine  Polacrilex Gum 2 milliGRAM(s) Oral every 2 hours PRN nicotine

## 2021-09-29 NOTE — BH INPATIENT PSYCHIATRY ASSESSMENT NOTE - OTHER PAST PSYCHIATRIC HISTORY (INCLUDE DETAILS REGARDING ONSET, COURSE OF ILLNESS, INPATIENT/OUTPATIENT TREATMENT)
Hx of 3 past inpatient psychiatric hospitalization, hx of suicide attempt in 2012 by attempt to hang self (pt used belt and tied to shower curtain chikis; placed around neck for 1 minute but then stopped); denies hx of cutting or other self-injurious behavior.  Patient first started seeing a therapist around Sept. 2011 and stopped in 2013.  She first saw a psychiatrist in Sept. 2012 and stopped in Sept. 2015 because she thought she was feeling better and no longer needed Lexapro.  Patient first started medication (Zoloft) in Oct. 2012. Discharged from [facility] in Nov 2019. most recently saw Dr. Myles, but stopped her home psychiatric medications since January 2020.  has not been in treatment since march 2020.

## 2021-09-29 NOTE — CONSULT NOTE ADULT - ASSESSMENT
24 y.o. F with h/o hyperthyroidism, depression, admitted at [facility] for SA/OD with gabapentin and thyroid toxicosis. Medically stable for transfer to [facility].    # Hyperthyroidism, likely due to Grave ds. Well controlled on Methimazole and Metoprolol   -Cont Methimazole 40mg daily and Metoprolol 50mg 2X/day  -Repeat TSH/FT4, T3 in 2 weeks  -Outpatient f/u with Endo after discharge     #Heart Murmur: Echo at [facility] showed normal LVF and no significant valvular ds.     # Tobacco abuse:  -Counselling provided to pt for smoking cessation. Pt is motivated to quit. Pt agreed with Nicotina patch to assist   -Nicotine patch 21mg daily   -F/u with PCP for smoking cessation    # Depression:  -Management as per Psych     D/W psych team

## 2021-09-29 NOTE — PSYCHIATRIC REHAB INITIAL EVALUATION - NSBHPRTOOLBOX_PSY_ALL_CORE
Patient reports coping skills such as grounding techniques in order to reduce negative emotions/Mindfulness practice

## 2021-09-29 NOTE — BH SOCIAL WORK INITIAL PSYCHOSOCIAL EVALUATION - OTHER PAST PSYCHIATRIC HISTORY (INCLUDE DETAILS REGARDING ONSET, COURSE OF ILLNESS, INPATIENT/OUTPATIENT TREATMENT)
Patient reports being admitted to [facility] twice in 2016.  She was being seen by Dr. Lizeth Myles at [facility], last time she was seen was earlier in the year 2020.  Patient reports being admitted to [facility] twice in 2016.    She was being seen by Dr. Lizeth Myles at [facility], last time she was seen was earlier in the year 2020.

## 2021-09-29 NOTE — PSYCHIATRIC REHAB INITIAL EVALUATION - NSBHPRRECOMMEND_PSY_ALL_CORE
Writer met with patient in order to orient patient to unit, provide patient with a schedule, and introduce patient to psychiatric staff and department functions. Patient was verbal, polite, and forthcoming with personal information. Patient reports passive SI. Patient reports SA in the context of OD. Patient reports her SA was impulsive. Patient reports she wasn't really thinking about the consequences at that moment. Patient reports hx of severe PTSD. Writer collaborated with patient to select an appropriate psychiatric rehabilitation goal. Psychiatric rehabilitation staff will continue to engage patient daily in order to develop therapeutic rapport.

## 2021-09-29 NOTE — BH INPATIENT PSYCHIATRY ASSESSMENT NOTE - MSE UNSTRUCTURED FT
The patient appears stated age, fair hygiene, dressed appropriately.  She was calm, cooperative with the interview.  She maintained appropriate eye contact.  No psychomotor agitation or retardation.  Steady gait.  The patient’s speech was fluent, normal in tone, rate, and volume.  The patient’s mood is “depressed.”  Affect is constricted, stable, appropriate.  The patient’s thoughts are goal directed.  She denies any delusions or hallucinations.  She admits to passive suicidal ideation, denies active SI, no homicidal ideation, intent, or plan.  Insight is fair.  Judgment is fair.  Impulse control has been fair on the unit.
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
## 2021-09-29 NOTE — BH INPATIENT PSYCHIATRY ASSESSMENT NOTE - NSBHMETABOLIC_PSY_ALL_CORE_FT
BMI: BMI (kg/m2): 32.4 (09-28-21 @ 17:07)  HbA1c:   Glucose:   BP: 132/79 (09-28-21 @ 22:25) (132/79 - 132/79)  Lipid Panel:
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
## 2021-09-29 NOTE — BH INPATIENT PSYCHIATRY ASSESSMENT NOTE - NSBHCHARTREVIEWVS_PSY_A_CORE FT
Vital Signs Last 24 Hrs  T(C): 36.7 (09-29-21 @ 06:04), Max: 36.8 (09-28-21 @ 17:07)  T(F): 98.1 (09-29-21 @ 06:04), Max: 98.3 (09-28-21 @ 17:07)  HR: 78 (09-28-21 @ 22:25) (69 - 78)  BP: 132/79 (09-28-21 @ 22:25) (121/65 - 132/79)  BP(mean): --  RR: 17 (09-28-21 @ 17:07) (17 - 18)  SpO2: 96% (09-28-21 @ 22:25) (96% - 100%)    Orthostatic VS  09-29-21 @ 06:04  Lying BP: --/-- HR: --  Sitting BP: 116/66 HR: 55  Standing BP: 115/69 HR: 76  Site: --  Mode: --  Orthostatic VS  09-28-21 @ 17:07  Lying BP: --/-- HR: --  Sitting BP: 137/85 HR: 76  Standing BP: 134/87 HR: 99  Site: --  Mode: --

## 2021-09-30 PROCEDURE — 99231 SBSQ HOSP IP/OBS SF/LOW 25: CPT

## 2021-09-30 PROCEDURE — 90853 GROUP PSYCHOTHERAPY: CPT

## 2021-09-30 PROCEDURE — 90832 PSYTX W PT 30 MINUTES: CPT | Mod: 59

## 2021-09-30 RX ADMIN — Medication 50 MILLIGRAM(S): at 09:06

## 2021-09-30 RX ADMIN — ESCITALOPRAM OXALATE 10 MILLIGRAM(S): 10 TABLET, FILM COATED ORAL at 09:07

## 2021-09-30 RX ADMIN — Medication 2 MILLIGRAM(S): at 17:11

## 2021-09-30 RX ADMIN — Medication 1 PATCH: at 09:07

## 2021-09-30 RX ADMIN — Medication 50 MILLIGRAM(S): at 20:38

## 2021-09-30 RX ADMIN — Medication 650 MILLIGRAM(S): at 00:50

## 2021-09-30 RX ADMIN — Medication 3 MILLIGRAM(S): at 20:37

## 2021-09-30 RX ADMIN — Medication 50 MILLIGRAM(S): at 20:37

## 2021-10-01 ENCOUNTER — OUTPATIENT (OUTPATIENT)
Dept: OUTPATIENT SERVICES | Facility: HOSPITAL | Age: 21
LOS: 1 days | End: 2021-10-01
Payer: MEDICAID

## 2021-10-01 PROCEDURE — 90853 GROUP PSYCHOTHERAPY: CPT

## 2021-10-01 PROCEDURE — 90832 PSYTX W PT 30 MINUTES: CPT | Mod: 59

## 2021-10-01 PROCEDURE — 99232 SBSQ HOSP IP/OBS MODERATE 35: CPT | Mod: 25

## 2021-10-01 RX ADMIN — Medication 3 MILLIGRAM(S): at 21:20

## 2021-10-01 RX ADMIN — Medication 1 PATCH: at 12:56

## 2021-10-01 RX ADMIN — Medication 50 MILLIGRAM(S): at 21:21

## 2021-10-01 RX ADMIN — ESCITALOPRAM OXALATE 10 MILLIGRAM(S): 10 TABLET, FILM COATED ORAL at 08:55

## 2021-10-01 RX ADMIN — Medication 50 MILLIGRAM(S): at 08:46

## 2021-10-01 NOTE — BH TREATMENT PLAN - NSTXDCOPLKINTERSW_PSY_ALL_CORE
Provide psychoeducation, motivation to meet goal, assist patient in obtaining outpatient servies, and remind patient the benefits of medication and community resouces such as groups.

## 2021-10-01 NOTE — BH TREATMENT PLAN - NSTXSUICIDINTERPR_PSY_ALL_CORE
Patient’s psychiatric rehabilitation goal is to meet with staff individually and attend psychiatric rehabilitation groups, in order for patient to identify thoughts associated with suicidal ideation for better symptom management and sustained recovery within 7 days.

## 2021-10-01 NOTE — BH TREATMENT PLAN - NSTXSUICIDINTERRN_PSY_ALL_CORE
Assess pt's mood and for S/I/I/P and SIB, explore healthy coping skills and protective factors, encourage pt to utilize coping skills and participate in groups and unit activities, provide support, maintain safety.

## 2021-10-02 PROCEDURE — 99232 SBSQ HOSP IP/OBS MODERATE 35: CPT

## 2021-10-02 RX ORDER — ESCITALOPRAM OXALATE 10 MG/1
15 TABLET, FILM COATED ORAL DAILY
Refills: 0 | Status: DISCONTINUED | OUTPATIENT
Start: 2021-10-03 | End: 2021-10-07

## 2021-10-02 RX ADMIN — Medication 50 MILLIGRAM(S): at 08:50

## 2021-10-02 RX ADMIN — ESCITALOPRAM OXALATE 10 MILLIGRAM(S): 10 TABLET, FILM COATED ORAL at 08:49

## 2021-10-02 RX ADMIN — Medication 50 MILLIGRAM(S): at 22:00

## 2021-10-02 RX ADMIN — Medication 3 MILLIGRAM(S): at 22:00

## 2021-10-02 RX ADMIN — Medication 1 PATCH: at 08:52

## 2021-10-03 RX ORDER — IBUPROFEN 200 MG
400 TABLET ORAL EVERY 6 HOURS
Refills: 0 | Status: DISCONTINUED | OUTPATIENT
Start: 2021-10-03 | End: 2021-10-07

## 2021-10-03 RX ADMIN — ESCITALOPRAM OXALATE 15 MILLIGRAM(S): 10 TABLET, FILM COATED ORAL at 08:44

## 2021-10-03 RX ADMIN — Medication 400 MILLIGRAM(S): at 18:18

## 2021-10-03 RX ADMIN — Medication 50 MILLIGRAM(S): at 08:44

## 2021-10-03 RX ADMIN — Medication 50 MILLIGRAM(S): at 21:55

## 2021-10-03 RX ADMIN — Medication 50 MILLIGRAM(S): at 23:30

## 2021-10-03 RX ADMIN — Medication 3 MILLIGRAM(S): at 21:55

## 2021-10-04 PROCEDURE — 90853 GROUP PSYCHOTHERAPY: CPT

## 2021-10-04 PROCEDURE — 90832 PSYTX W PT 30 MINUTES: CPT | Mod: 59

## 2021-10-04 PROCEDURE — 99231 SBSQ HOSP IP/OBS SF/LOW 25: CPT | Mod: 25

## 2021-10-04 RX ORDER — METOPROLOL TARTRATE 50 MG
12.5 TABLET ORAL
Refills: 0 | Status: DISCONTINUED | OUTPATIENT
Start: 2021-10-04 | End: 2021-10-07

## 2021-10-04 RX ADMIN — Medication 12.5 MILLIGRAM(S): at 22:27

## 2021-10-04 RX ADMIN — Medication 3 MILLIGRAM(S): at 22:26

## 2021-10-04 RX ADMIN — Medication 400 MILLIGRAM(S): at 20:42

## 2021-10-04 RX ADMIN — ESCITALOPRAM OXALATE 15 MILLIGRAM(S): 10 TABLET, FILM COATED ORAL at 09:50

## 2021-10-04 RX ADMIN — Medication 50 MILLIGRAM(S): at 22:27

## 2021-10-05 PROCEDURE — 99231 SBSQ HOSP IP/OBS SF/LOW 25: CPT

## 2021-10-05 PROCEDURE — 90853 GROUP PSYCHOTHERAPY: CPT

## 2021-10-05 RX ADMIN — Medication 400 MILLIGRAM(S): at 00:02

## 2021-10-05 RX ADMIN — ESCITALOPRAM OXALATE 15 MILLIGRAM(S): 10 TABLET, FILM COATED ORAL at 09:21

## 2021-10-05 RX ADMIN — Medication 12.5 MILLIGRAM(S): at 09:26

## 2021-10-05 RX ADMIN — Medication 3 MILLIGRAM(S): at 21:25

## 2021-10-05 RX ADMIN — Medication 12.5 MILLIGRAM(S): at 21:25

## 2021-10-05 RX ADMIN — Medication 50 MILLIGRAM(S): at 21:25

## 2021-10-05 NOTE — BH PSYCHOLOGY - GROUP THERAPY NOTE - NSPSYCHOLGRPDBTINT_PSY_A_CORE FT
taught emotion regulation skill 
taught mindfulness skill 
taught Emotion Regulation skill of Model for Describing Emotions

## 2021-10-06 PROCEDURE — 90832 PSYTX W PT 30 MINUTES: CPT

## 2021-10-06 PROCEDURE — 90853 GROUP PSYCHOTHERAPY: CPT

## 2021-10-06 PROCEDURE — 99231 SBSQ HOSP IP/OBS SF/LOW 25: CPT | Mod: 25

## 2021-10-06 RX ORDER — ESCITALOPRAM OXALATE 10 MG/1
1 TABLET, FILM COATED ORAL
Qty: 15 | Refills: 0
Start: 2021-10-06 | End: 2021-10-20

## 2021-10-06 RX ORDER — METHIMAZOLE 10 MG/1
4 TABLET ORAL
Qty: 60 | Refills: 0
Start: 2021-10-06 | End: 2021-10-20

## 2021-10-06 RX ORDER — METOPROLOL TARTRATE 50 MG
0.5 TABLET ORAL
Qty: 15 | Refills: 0
Start: 2021-10-06 | End: 2021-10-20

## 2021-10-06 RX ORDER — LANOLIN ALCOHOL/MO/W.PET/CERES
1 CREAM (GRAM) TOPICAL
Qty: 15 | Refills: 0
Start: 2021-10-06 | End: 2021-10-20

## 2021-10-06 RX ORDER — TRAZODONE HCL 50 MG
1 TABLET ORAL
Qty: 15 | Refills: 0
Start: 2021-10-06 | End: 2021-10-20

## 2021-10-06 RX ADMIN — Medication 1 PATCH: at 09:51

## 2021-10-06 RX ADMIN — Medication 50 MILLIGRAM(S): at 21:41

## 2021-10-06 RX ADMIN — Medication 400 MILLIGRAM(S): at 20:20

## 2021-10-06 RX ADMIN — Medication 12.5 MILLIGRAM(S): at 09:48

## 2021-10-06 RX ADMIN — ESCITALOPRAM OXALATE 15 MILLIGRAM(S): 10 TABLET, FILM COATED ORAL at 09:48

## 2021-10-06 RX ADMIN — Medication 3 MILLIGRAM(S): at 21:41
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
## 2021-10-06 NOTE — BH PSYCHOLOGY - CLINICIAN PSYCHOTHERAPY NOTE - NSBHPSYCHOLBILLFAM_PSY_A_CORE
[id number] - 16 to 37 minutes
[id number] - 16 to 37 minutes
69992 - 16 to 37 minutes
[id number] - 16 to 37 minutes
73532 - 16 to 37 minutes

## 2021-10-06 NOTE — BH PSYCHOLOGY - CLINICIAN PSYCHOTHERAPY NOTE - NSBHPSYCHOLASSESSPROV_PSY_A_CORE
Licensed Staff Psychologist

## 2021-10-06 NOTE — BH PSYCHOLOGY - GROUP THERAPY NOTE - NSBHPSYCHOLRESPONSE_PSY_A_CORE
Accepted support
Insight displayed/Accepted support
Coping skills acquired/Accepted support
Accepted support
Insight displayed/Accepted support

## 2021-10-06 NOTE — BH PSYCHOLOGY - GROUP THERAPY NOTE - NSPSYCHOLGRPDBTGOAL_PSY_A_CORE
reduce mood and affective lability/improve ability to indentify feelings/improve ability to communicate feelings/improve ability re: assertive/set limits/reduce vulnerability to emotional dysregualation
reduce mood and affective lability/improve ability to indentify feelings/improve ability to communicate feelings/improve ability re: assertive/set limits/reduce vulnerability to emotional dysregualation
reduce mood and affective lability/reduce suicidal ideation/risk of attempt/improve ability to indentify feelings/improve ability to communicate feelings/reduce vulnerability to emotional dysregualation
reduce mood and affective lability/improve ability to indentify feelings/improve ability to communicate feelings/improve ability re: assertive/set limits/reduce vulnerability to emotional dysregualation

## 2021-10-06 NOTE — BH DISCHARGE NOTE NURSING/SOCIAL WORK/PSYCH REHAB - NSDCVIVACCINE_GEN_ALL_CORE_FT
No Vaccines Administered. influenza, injectable, quadrivalent, preservative free; 07-Oct-2021 09:48; Isaiah Luke (RN); [employer]; [id number] (Exp. Date: 30-Jun-2022); IntraMuscular; Deltoid Left.; 0.5 milliLiter(s); VIS (VIS Published: 15-Aug-2019, VIS Presented: 07-Oct-2021);

## 2021-10-06 NOTE — BH DISCHARGE NOTE NURSING/SOCIAL WORK/PSYCH REHAB - DISCHARGE INSTRUCTIONS AFTERCARE APPOINTMENTS
In order to check the location, date, or time of your aftercare appointment, please refer to your Discharge Instructions Document given to you upon leaving the hospital.  If you have lost the instructions please call [phone]

## 2021-10-06 NOTE — BH PSYCHOLOGY - GROUP THERAPY NOTE - NSPSYCHOLGRPDBTPROB_PSY_A_CORE
depressed mood/suicidal ideation

## 2021-10-06 NOTE — BH DISCHARGE NOTE NURSING/SOCIAL WORK/PSYCH REHAB - NSBHDCAGENCY1FT_PSY_A_CORE
[facility] Program Intake with  [facility] - [facility] Program Intake Appointment

## 2021-10-06 NOTE — BH PSYCHOLOGY - CLINICIAN PSYCHOTHERAPY NOTE - NSBHPSYCHOLSERV_PSY_A_CORE
Individual psychotherapy

## 2021-10-06 NOTE — BH PSYCHOLOGY - GROUP THERAPY NOTE - NSPSYCHOLGRPDBTINT_PSY_A_CORE
review emotion regulation homework
other...
review emotion regulation homework
reviewed distress tolerance, skills homework
other...
review emotion regulation homework
other...

## 2021-10-06 NOTE — BH PSYCHOLOGY - CLINICIAN PSYCHOTHERAPY NOTE - NSTXSUICIDGOAL_PSY_ALL_CORE
Be able to state 3 reasons for living
Will identify and utilize 2 coping skills
Be able to state 3 reasons for living

## 2021-10-06 NOTE — BH PSYCHOLOGY - GROUP THERAPY NOTE - NSPSYCHOLGRPDBTEMOT_PSY_A_CORE FT
na
na
Build Mastery/[place] A[place]
Opposite Action
taught Emotion Regulation skill of Model for Describing Emotions
Opposite Action 
na

## 2021-10-06 NOTE — BH PSYCHOLOGY - GROUP THERAPY NOTE - NSPSYCHOLGRPDBTPT_PSY_A_CORE FT
DBT Group is a group in which patients learn skills to better manage their emotions and behaviors. Group today focused on the “mindfulness” module, which are skills to help patients increase their awareness of their present experience without judgment. Pts were taught the “what” skills (observe, describe, participate) and “how” skills (non-judgmentally, effectively, and one-mindfully) of mindfulness, and engaged in a variety of mindfulness exercises to practice the skills, and shared observations at the end of each activity. 
DBT skills generalization group is a group in which patients review the skill taught the day before, and patients have the opportunity to troubleshoot the skill, engage in more practice, and share their experience using the skill. Today’s skills review group focused on the Build Mastery and [place] Ahead skills within the Emotion Regulation module. Patients were asked to share activities in which they can engage that provide feelings of accomplishment or mastery. Patients also shared examples of [place] Ahead plans they have created to manage potential difficult situations, and feedback from leader as well as peers was provided.
DBT Group is a group in which patients learn skills to better manage their emotions and behaviors. Group begins with a mindfulness practice so that patients have an opportunity to practice observing their internal and external experiences.  Following the mindfulness exercise the group learns new skills in a didactic format. Group today focused on the “distress tolerance” module, which are skills to help patients manage their crisis urges.  Specifically, pts learned the skill of “radical acceptance,” which includes accepting painful situations in their lives they cannot change through turning the mind and practicing willingness. Patients were asked to determine difficult situations in their lives they needed to work on accepting, and provided examples of ways to practice this while in the hospital. 
DBT skills generalization group is a group in which patients review the skill taught the day before, and patients have the opportunity to troubleshoot the skill, engage in more practice, and share their experience using the skill. Today’s skills review group focused on the skills of “radical acceptance” and "willingness" which include accepting painful situations in their lives they cannot change through turning the mind and practicing engaging in reality effectively. Patients were asked to determine difficult situations in their lives they needed to work on accepting, and provided examples of ways to practice this while in the hospital.  
DBT Group is a group in which patients learn skills to better manage their emotions and behaviors. Group begins with a mindfulness practice so that patients have an opportunity to practice observing their internal and external experiences.  Following the mindfulness exercise the group learns new skills in a didactic format. Group today focused on the “emotion regulation” module.  Specifically, patients learned the skills of “check the facts,” and “opposite action.” “Check the facts” is about being more realistic about interpretations and assumptions and challenging them appropriately to think more rationally, and “opposite action” involves acting opposite to problematic urges that come with emotions.  provided an example of checking the facts, and patients were encouraged to think about how these skills, and were assigned homework to practice. 
DBT Group is a group in which patients learn skills to better manage their emotions and behaviors. Group begins with a mindfulness practice so that patients have an opportunity to practice observing their internal and external experiences.  Following the mindfulness exercise the group learns new skills in a didactic format. Group today focused on the “emotion regulation” module.  Specifically, patients learned about the Model for Describing Emotions, which explains the different components of an emotion, including prompting event, vulnerability factors, interpretations, biological changes and actions. Patients were also taught various techniques for changing the trajectory of an emotion through mindful awareness, problem solving, check the facts and opposite action.  
DBT skills generalization group is a group in which patients review the skill taught the day before, and patients have the opportunity to troubleshoot the skill, engage in more practice, and share their experience using the skill. Today’s skills review group focused on the skills of “check the facts,” and “opposite action.” “Check the facts” is about being more realistic about interpretations and assumptions and challenging them appropriately to think more rationally, and “opposite action” involves acting opposite to problematic urges that come with emotions in order to change negative emotions.  encouraged patients to share examples and leader as well as fellow participants provided helpful feedback and support.

## 2021-10-06 NOTE — BH PSYCHOLOGY - GROUP THERAPY NOTE - NSBHPSYCHOLGRPNAME_PSY_A_CORE
Developing Social Supports
DBT Homework
DBT Skills
DBT Skills
DBT Homework
DBT Homework
DBT Skills
DBT Skills

## 2021-10-06 NOTE — BH PSYCHOLOGY - CLINICIAN PSYCHOTHERAPY NOTE - NSBHPSYCHOLINT_PSY_A_CORE
Dialectical  Behavioral Therapy (DBT)

## 2021-10-06 NOTE — BH PSYCHOLOGY - CLINICIAN PSYCHOTHERAPY NOTE - NSBHPSYCHOLGOALS_PSY_A_CORE
Decrease symptoms/Assessment/Improve social/vocational/coping skills/Psychoeducation

## 2021-10-06 NOTE — BH DISCHARGE NOTE NURSING/SOCIAL WORK/PSYCH REHAB - PATIENT PORTAL LINK FT
You can access the FollowMyHealth Patient Portal offered by [facility] by registering at the following website: [email or web address]. By joining [employer]’s FollowMyHealth portal, you will also be able to view your health information using other applications (apps) compatible with our system.

## 2021-10-06 NOTE — BH PSYCHOLOGY - GROUP THERAPY NOTE - NSBHPSYCHOLGRPTYPE_PSY_A_CORE
DBT Life Skills
DBT Life Skills
General Group Therapy
DBT Life Skills

## 2021-10-06 NOTE — BH PSYCHOLOGY - CLINICIAN PSYCHOTHERAPY NOTE - NSBHPSYCHOLPROBS_PSY_ALL_CORE
Depression/Suicidality

## 2021-10-06 NOTE — BH PSYCHOLOGY - GROUP THERAPY NOTE - NSPSYCHOLGRPDBTPT_PSY_A_CORE
stable mood and affect in group/patient showing good behavior control/Patient able to identify mood states/other...
stable mood and affect in group/patient showing good behavior control/other...
stable mood and affect in group/patient showing good behavior control/other...
stable mood and affect in group/no self-destructive behaviors/impulses reported/Patient unable to identify mood states/other...
stable mood and affect in group/no self-destructive behaviors/impulses reported/Patient unable to identify mood states/other...
stable mood and affect in group/patient showing good behavior control/Patient able to identify mood states/other...
stable mood and affect in group/no self-destructive behaviors/impulses reported/Patient unable to identify mood states/other...

## 2021-10-06 NOTE — BH PSYCHOLOGY - CLINICIAN PSYCHOTHERAPY NOTE - NSTXDEPRESGOAL_PSY_ALL_CORE
Report journaling 5 counter-statements to negative predictions/self-image daily

## 2021-10-06 NOTE — BH DISCHARGE NOTE NURSING/SOCIAL WORK/PSYCH REHAB - NSDCPRRECOMMEND_PSY_ALL_CORE
Psychiatric rehabilitation staff recommends patient will benefit from attending [facility] for medication management, support, and psychotherapy.

## 2021-10-06 NOTE — BH DISCHARGE NOTE NURSING/SOCIAL WORK/PSYCH REHAB - NSCDUDCCRISIS_PSY_A_CORE
[facility]  1 (610) Community [facility]-WELL ([phone])  Text "WELL" to 33038  Website: www.[employer]/.[facility] 1 (547) 511-XPIZ (9634) Website: www.safehorizon.org/.National Suicide Prevention Lifeline 6 (499) 759-3679/.  [employer]  1 (727) LIFENET (533-2170)/.  [facility]  [address] [place] [phone]   Hours:  Monday through Friday from 9 AM to 3 PM/.  [employer] [employer] - [facility] Crisis Line  0 (614) 919-3114, Option 1

## 2021-10-06 NOTE — BH PSYCHOLOGY - GROUP THERAPY NOTE - NSPSYCHOLGRPGENPT_PSY_A_CORE FT
Project [employer] is an ACT-based group in which patients learn skills and explore themes of identity, compassion, resilience, and connection through the lens of marginalized identity. Group begins with setting group expectations and introductions that focus on identity exploration. Following introductions, the daily theme is presented through both didactics and experiential activities. Group today focused on the theme “identity.” Patients were provided psychoeducation about identity and engaged in discussion about types of identity and the importance of values on identity formation.  provided experiential activities that allowed patients to engage in their own values exploration, determining the unique values that help to form their identities.

## 2021-10-06 NOTE — BH PSYCHOLOGY - GROUP THERAPY NOTE - NSBHPSYCHOLASSESSPROV_PSY_A_CORE
Licensed Staff Psychologist
Trainee Only
Licensed Staff Psychologist

## 2021-10-06 NOTE — CHART NOTE - NSCHARTNOTEFT_GEN_A_CORE
Will repeat TFT's tomorrow AM per endo reccs (order placed). For now cont methimazole 40 mg qd and metoprolol tartrate 12.5 mg BID, HR at goal.    Cris Mujica MD  Pager # [phone]/ [phone]

## 2021-10-06 NOTE — BH DISCHARGE NOTE NURSING/SOCIAL WORK/PSYCH REHAB - NSDCPRGOAL_PSY_ALL_CORE
During the current hospitalization, patient has been addressing psychiatric rehabilitation goals pertaining to identifying coping skills that assist in improving mood. Patient has demonstrated progress towards psychiatric rehabilitation goals during the current hospitalization. Patient exhibited progress through participating in individual and group therapy and developing additional coping skills to assist with managing negative emotions such as opposite action, pace breathing, and imagery. Writer encouraged patient to continue to strengthen and practice effective skills. Patient was receptive. Patient met goal of identifying coping skills as evidenced by reporting these skills have helped him during current hospitalizations. Patient reports he will continue to practice coping skills. Patient reports overall improvement in mood. Patient reports feeling "happy" to go home. Patient reports he is looking forward to seeing his family and friends. Patient completed safety plan with unit staff. Patient was compliant with medication during current hospitalization. Patient was visible on the unit and was appropriate with peers and staff. Patient was provided with a Press Ganey survey prior to discharge.  During the current hospitalization, patient has been addressing psychiatric rehabilitation goals pertaining to identifying thoughts associated with suicidal ideation. Patient has demonstrated progress towards psychiatric rehabilitation goals during the current hospitalization. Patient exhibited progress through participating in individual and group therapy and developing additional coping skills to assist with managing negative emotions such as opposite action, TIPP, grounding techniques going for walks, meditation and listening to music. Writer encouraged patient to continue to strengthen and practice effective skills. Patient was receptive. Patient met goal of identifying thoughts associated with suicidal ideation by reporting she starts to isolate when she gets thoughts about "not being/feeling enough". Patient also reports other thoughts she would get about fantasizing how she would kill herself. Writer provided support and validation. Patient was receptive. Patient reports she will continue to practice coping skills in order to cope with negative emotions and negative thoughts. Patient reports overall improvement in mood. Patient reports feeling happy and ready to go home. Patient reports she is looking forward to seeing her friends after being discharged. Patient completed safety plan with unit psychologist. Patient was compliant with medications during current hospitalization. Patient was visible on the unit and was appropriate with peers and staff. Patient was provided with a Press Ganey survey prior to discharge.

## 2021-10-07 VITALS — SYSTOLIC BLOOD PRESSURE: 127 MMHG | DIASTOLIC BLOOD PRESSURE: 79 MMHG | RESPIRATION RATE: 17 BRPM | TEMPERATURE: 98 F

## 2021-10-07 LAB
T3 SERPL-MCNC: 113 NG/DL — SIGNIFICANT CHANGE UP (ref 80–200)
T4 FREE SERPL-MCNC: 0.4 NG/DL — LOW (ref 0.9–1.8)
TSH SERPL-MCNC: <0.1 UIU/ML — LOW (ref 0.27–4.2)

## 2021-10-07 PROCEDURE — 99238 HOSP IP/OBS DSCHRG MGMT 30/<: CPT | Mod: 25

## 2021-10-07 RX ORDER — INFLUENZA VIRUS VACCINE 15; 15; 15; 15 UG/.5ML; UG/.5ML; UG/.5ML; UG/.5ML
0.5 SUSPENSION INTRAMUSCULAR ONCE
Refills: 0 | Status: COMPLETED | OUTPATIENT
Start: 2021-10-07 | End: 2021-10-07

## 2021-10-07 RX ADMIN — ESCITALOPRAM OXALATE 15 MILLIGRAM(S): 10 TABLET, FILM COATED ORAL at 09:56

## 2021-10-07 RX ADMIN — INFLUENZA VIRUS VACCINE 0.5 MILLILITER(S): 15; 15; 15; 15 SUSPENSION INTRAMUSCULAR at 09:48

## 2021-10-07 RX ADMIN — Medication 12.5 MILLIGRAM(S): at 09:32

## 2021-10-07 RX ADMIN — Medication 1 PATCH: at 09:56

## 2021-10-07 RX ADMIN — Medication 1 PATCH: at 07:00

## 2021-10-07 NOTE — BH INPATIENT PSYCHIATRY PROGRESS NOTE - NSICDXBHSECONDARYDX_PSY_ALL_CORE
Post traumatic stress disorder (PTSD)   F43.10  Hyperthyroidism   E05.90  

## 2021-10-07 NOTE — BH INPATIENT PSYCHIATRY DISCHARGE NOTE - NSDCCPCAREPLAN_GEN_ALL_CORE_FT
PRINCIPAL DISCHARGE DIAGNOSIS  Diagnosis: Major depressive disorder, recurrent episode, severe  Assessment and Plan of Treatment:       SECONDARY DISCHARGE DIAGNOSES  Diagnosis: Post traumatic stress disorder (PTSD)  Assessment and Plan of Treatment:     Diagnosis: Hyperthyroidism  Assessment and Plan of Treatment:

## 2021-10-07 NOTE — BH INPATIENT PSYCHIATRY PROGRESS NOTE - NSICDXBHPRIMARYDX_PSY_ALL_CORE
Major depressive disorder, recurrent episode, severe   F33.2  

## 2021-10-07 NOTE — BH INPATIENT PSYCHIATRY PROGRESS NOTE - CURRENT MEDICATION
MEDICATIONS  (STANDING):  escitalopram 15 milliGRAM(s) Oral daily  melatonin. 3 milliGRAM(s) Oral at bedtime  methimazole 40 milliGRAM(s) Oral daily  metoprolol tartrate 12.5 milliGRAM(s) Oral two times a day  nicotine - 21 mG/24Hr(s) Patch 1 patch Transdermal daily  traZODone 50 milliGRAM(s) Oral at bedtime    MEDICATIONS  (PRN):  aluminum hydroxide/magnesium hydroxide/simethicone Suspension 30 milliLiter(s) Oral every 4 hours PRN Dyspepsia  ibuprofen  Tablet. 400 milliGRAM(s) Oral every 6 hours PRN Temp greater or equal to 38C (100.4F), Mild Pain (1 - 3), Moderate Pain (4 - 6)  LORazepam     Tablet 0.5 milliGRAM(s) Oral every 6 hours PRN anxiety  LORazepam   Injectable 2 milliGRAM(s) IntraMuscular once PRN agitation  nicotine  Polacrilex Gum 2 milliGRAM(s) Oral every 2 hours PRN nicotine  
MEDICATIONS  (STANDING):  escitalopram 15 milliGRAM(s) Oral daily  melatonin. 3 milliGRAM(s) Oral at bedtime  methimazole 40 milliGRAM(s) Oral daily  metoprolol tartrate 12.5 milliGRAM(s) Oral two times a day  nicotine - 21 mG/24Hr(s) Patch 1 patch Transdermal daily  traZODone 50 milliGRAM(s) Oral at bedtime    MEDICATIONS  (PRN):  aluminum hydroxide/magnesium hydroxide/simethicone Suspension 30 milliLiter(s) Oral every 4 hours PRN Dyspepsia  ibuprofen  Tablet. 400 milliGRAM(s) Oral every 6 hours PRN Temp greater or equal to 38C (100.4F), Mild Pain (1 - 3), Moderate Pain (4 - 6)  LORazepam     Tablet 0.5 milliGRAM(s) Oral every 6 hours PRN anxiety  LORazepam   Injectable 2 milliGRAM(s) IntraMuscular once PRN agitation  nicotine  Polacrilex Gum 2 milliGRAM(s) Oral every 2 hours PRN nicotine  
MEDICATIONS  (STANDING):  escitalopram 5 milliGRAM(s) Oral once  escitalopram 10 milliGRAM(s) Oral daily  melatonin. 3 milliGRAM(s) Oral at bedtime  methimazole 40 milliGRAM(s) Oral daily  metoprolol tartrate 50 milliGRAM(s) Oral two times a day  nicotine - 21 mG/24Hr(s) Patch 1 patch Transdermal daily  traZODone 50 milliGRAM(s) Oral at bedtime    MEDICATIONS  (PRN):  aluminum hydroxide/magnesium hydroxide/simethicone Suspension 30 milliLiter(s) Oral every 4 hours PRN Dyspepsia  LORazepam     Tablet 0.5 milliGRAM(s) Oral every 6 hours PRN anxiety  LORazepam   Injectable 2 milliGRAM(s) IntraMuscular once PRN agitation  nicotine  Polacrilex Gum 2 milliGRAM(s) Oral every 2 hours PRN nicotine  
MEDICATIONS  (STANDING):  escitalopram 15 milliGRAM(s) Oral daily  melatonin. 3 milliGRAM(s) Oral at bedtime  methimazole 40 milliGRAM(s) Oral daily  metoprolol tartrate 50 milliGRAM(s) Oral two times a day  nicotine - 21 mG/24Hr(s) Patch 1 patch Transdermal daily  traZODone 50 milliGRAM(s) Oral at bedtime    MEDICATIONS  (PRN):  aluminum hydroxide/magnesium hydroxide/simethicone Suspension 30 milliLiter(s) Oral every 4 hours PRN Dyspepsia  LORazepam     Tablet 0.5 milliGRAM(s) Oral every 6 hours PRN anxiety  LORazepam   Injectable 2 milliGRAM(s) IntraMuscular once PRN agitation  nicotine  Polacrilex Gum 2 milliGRAM(s) Oral every 2 hours PRN nicotine  
MEDICATIONS  (STANDING):  escitalopram 5 milliGRAM(s) Oral once  escitalopram 10 milliGRAM(s) Oral daily  melatonin. 3 milliGRAM(s) Oral at bedtime  methimazole 40 milliGRAM(s) Oral daily  metoprolol tartrate 50 milliGRAM(s) Oral two times a day  nicotine - 21 mG/24Hr(s) Patch 1 patch Transdermal daily  traZODone 50 milliGRAM(s) Oral at bedtime    MEDICATIONS  (PRN):  aluminum hydroxide/magnesium hydroxide/simethicone Suspension 30 milliLiter(s) Oral every 4 hours PRN Dyspepsia  LORazepam     Tablet 0.5 milliGRAM(s) Oral every 6 hours PRN anxiety  LORazepam   Injectable 2 milliGRAM(s) IntraMuscular once PRN agitation  nicotine  Polacrilex Gum 2 milliGRAM(s) Oral every 2 hours PRN nicotine  
MEDICATIONS  (STANDING):  escitalopram 15 milliGRAM(s) Oral daily  melatonin. 3 milliGRAM(s) Oral at bedtime  methimazole 40 milliGRAM(s) Oral daily  metoprolol tartrate 12.5 milliGRAM(s) Oral two times a day  nicotine - 21 mG/24Hr(s) Patch 1 patch Transdermal daily  traZODone 50 milliGRAM(s) Oral at bedtime    MEDICATIONS  (PRN):  aluminum hydroxide/magnesium hydroxide/simethicone Suspension 30 milliLiter(s) Oral every 4 hours PRN Dyspepsia  ibuprofen  Tablet. 400 milliGRAM(s) Oral every 6 hours PRN Temp greater or equal to 38C (100.4F), Mild Pain (1 - 3), Moderate Pain (4 - 6)  LORazepam     Tablet 0.5 milliGRAM(s) Oral every 6 hours PRN anxiety  LORazepam   Injectable 2 milliGRAM(s) IntraMuscular once PRN agitation  nicotine  Polacrilex Gum 2 milliGRAM(s) Oral every 2 hours PRN nicotine  
MEDICATIONS  (STANDING):  escitalopram 5 milliGRAM(s) Oral once  escitalopram 10 milliGRAM(s) Oral daily  melatonin. 3 milliGRAM(s) Oral at bedtime  methimazole 40 milliGRAM(s) Oral daily  metoprolol tartrate 50 milliGRAM(s) Oral two times a day  nicotine - 21 mG/24Hr(s) Patch 1 patch Transdermal daily  traZODone 50 milliGRAM(s) Oral at bedtime    MEDICATIONS  (PRN):  aluminum hydroxide/magnesium hydroxide/simethicone Suspension 30 milliLiter(s) Oral every 4 hours PRN Dyspepsia  LORazepam     Tablet 0.5 milliGRAM(s) Oral every 6 hours PRN anxiety  LORazepam   Injectable 2 milliGRAM(s) IntraMuscular once PRN agitation  nicotine  Polacrilex Gum 2 milliGRAM(s) Oral every 2 hours PRN nicotine  
MEDICATIONS  (STANDING):  escitalopram 15 milliGRAM(s) Oral daily  melatonin. 3 milliGRAM(s) Oral at bedtime  methimazole 40 milliGRAM(s) Oral daily  metoprolol tartrate 12.5 milliGRAM(s) Oral two times a day  nicotine - 21 mG/24Hr(s) Patch 1 patch Transdermal daily  traZODone 50 milliGRAM(s) Oral at bedtime    MEDICATIONS  (PRN):  aluminum hydroxide/magnesium hydroxide/simethicone Suspension 30 milliLiter(s) Oral every 4 hours PRN Dyspepsia  ibuprofen  Tablet. 400 milliGRAM(s) Oral every 6 hours PRN Temp greater or equal to 38C (100.4F), Mild Pain (1 - 3), Moderate Pain (4 - 6)  LORazepam     Tablet 0.5 milliGRAM(s) Oral every 6 hours PRN anxiety  LORazepam   Injectable 2 milliGRAM(s) IntraMuscular once PRN agitation  nicotine  Polacrilex Gum 2 milliGRAM(s) Oral every 2 hours PRN nicotine

## 2021-10-07 NOTE — BH INPATIENT PSYCHIATRY PROGRESS NOTE - NSBHCONSDANGERSELF_PSY_A_CORE
suicidal behavior/suicidal ideation with plan and means
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
## 2021-10-07 NOTE — BH INPATIENT PSYCHIATRY PROGRESS NOTE - MSE UNSTRUCTURED FT
The patient appears stated age, fair hygiene, dressed appropriately.  She was calm, cooperative with the interview.  She maintained appropriate eye contact.  No psychomotor agitation or retardation.  Steady gait.  The patient’s speech was fluent, normal in tone, rate, and volume.  The patient’s mood is “depressed.”  Affect is constricted, stable, appropriate.  The patient’s thoughts are goal directed.  She denies any delusions or hallucinations.  She admits to passive suicidal ideation, denies active SI, no homicidal ideation, intent, or plan.  Insight is fair.  Judgment is fair.  Impulse control has been fair on the unit.
The patient appears stated age, fair hygiene, dressed appropriately.  She was calm, cooperative with the interview.  She maintained appropriate eye contact.  No psychomotor agitation or retardation.  Steady gait.  The patient’s speech was fluent, normal in tone, rate, and volume.  The patient’s mood is “good.”  Affect is full, reactive, stable, and appropriate.  The patient’s thoughts are goal directed.  She denies any delusions or hallucinations.  She admits to rare passive suicidal ideation, denies active SI, no homicidal ideation, intent, or plan.  Insight is fair.  Judgment is good.  Impulse control has been good on the unit.
The patient appears stated age, fair hygiene, dressed appropriately.  She was calm, cooperative with the interview.  She maintained appropriate eye contact.  No psychomotor agitation or retardation.  Steady gait.  The patient’s speech was fluent, normal in tone, rate, and volume.  The patient’s mood is “good.”  Affect is full, more reactive, stable, and appropriate.  The patient’s thoughts are goal directed.  She denies any delusions or hallucinations.  She admits to intermittent passive suicidal ideation, denies active SI, no homicidal ideation, intent, or plan.  Insight is fair.  Judgment is good.  Impulse control has been good on the unit.
The patient appears stated age, fair hygiene, dressed appropriately.  She was calm, cooperative with the interview.  She maintained appropriate eye contact.  No psychomotor agitation or retardation.  Steady gait.  The patient’s speech was fluent, normal in tone, rate, and volume.  The patient’s mood is “getting better.”  Affect is constricted but more reactive, stable, and appropriate.  The patient’s thoughts are goal directed.  She denies any delusions or hallucinations.  She admits to passive suicidal ideation, denies active SI, no homicidal ideation, intent, or plan.  Insight is fair and improving.  Judgment is fair and improving as well.  Impulse control has been fair on the unit.
The patient appears stated age, fair hygiene, dressed appropriately.  She was calm, cooperative with the interview.  She maintained appropriate eye contact.  No psychomotor agitation or retardation.  Steady gait.  The patient’s speech was fluent, normal in tone, rate, and volume.  The patient’s mood is “getting better.”  Affect is constricted, but more reactive, stable, appropriate.  The patient’s thoughts are goal directed.  She denies any delusions or hallucinations.  She admits to passive suicidal ideation, denies active SI, no homicidal ideation, intent, or plan.  Insight is fair.  Judgment is fair.  Impulse control has been fair on the unit.
The patient appears stated age, fair hygiene, dressed appropriately.  She was calm, cooperative with the interview.  She maintained appropriate eye contact.  No psychomotor agitation or retardation.  Steady gait.  The patient’s speech was fluent, normal in tone, rate, and volume.  The patient’s mood is “getting better.”  Affect is more full, more reactive, stable, and appropriate.  The patient’s thoughts are goal directed.  She denies any delusions or hallucinations.  She admits to intermittent passive suicidal ideation, denies active SI, no homicidal ideation, intent, or plan.  Insight is fair and improving.  Judgment is fair and improving as well.  Impulse control has been fair on the unit.
The patient appears stated age, fair hygiene, dressed appropriately.  She was calm, cooperative with the interview.  She maintained appropriate eye contact.  No psychomotor agitation or retardation.  Steady gait.  The patient’s speech was fluent, normal in tone, rate, and volume.  The patient’s mood is “good.”  Affect is full, appropriately reactive and stable.  The patient’s thoughts are goal directed.  She denies any delusions or hallucinations.  She admits to passive suicidal ideation, denies active SI, reports remorse for SA, no homicidal ideation, intent, or plan.  Insight is fair and improving.  Judgment is fair.  Impulse control has been fair on the unit.
The patient appears stated age, fair hygiene, dressed appropriately.  She was calm, cooperative with the interview.  She maintained appropriate eye contact.  No psychomotor agitation or retardation.  Steady gait.  The patient’s speech was fluent, normal in tone, rate, and volume.  The patient’s mood is “okay.”  Affect is full, appropriately reactive and stable.  The patient’s thoughts are goal directed.  She denies any delusions or hallucinations.  She admits to passive suicidal ideation, denies active SI, some frustration with recent roommate switch, no homicidal ideation, intent, or plan.  Insight is fair and improving.  Judgment is fair and improving as well.  Impulse control has been fair on the unit.

## 2021-10-07 NOTE — BH INPATIENT PSYCHIATRY DISCHARGE NOTE - NSDCTESTSFT_PSY_ALL_CORE
None Call for follow-up appointment: Endocrinology [address] [facility], [facility] [address], [place] [address] [phone]

## 2021-10-07 NOTE — BH INPATIENT PSYCHIATRY PROGRESS NOTE - NSTXSUICIDINTERMD_PSY_ALL_CORE
Lexapro trial

## 2021-10-07 NOTE — BH INPATIENT PSYCHIATRY PROGRESS NOTE - PRN MEDS
MEDICATIONS  (PRN):  aluminum hydroxide/magnesium hydroxide/simethicone Suspension 30 milliLiter(s) Oral every 4 hours PRN Dyspepsia  LORazepam     Tablet 0.5 milliGRAM(s) Oral every 6 hours PRN anxiety  LORazepam   Injectable 2 milliGRAM(s) IntraMuscular once PRN agitation  nicotine  Polacrilex Gum 2 milliGRAM(s) Oral every 2 hours PRN nicotine  
MEDICATIONS  (PRN):  aluminum hydroxide/magnesium hydroxide/simethicone Suspension 30 milliLiter(s) Oral every 4 hours PRN Dyspepsia  ibuprofen  Tablet. 400 milliGRAM(s) Oral every 6 hours PRN Temp greater or equal to 38C (100.4F), Mild Pain (1 - 3), Moderate Pain (4 - 6)  LORazepam     Tablet 0.5 milliGRAM(s) Oral every 6 hours PRN anxiety  LORazepam   Injectable 2 milliGRAM(s) IntraMuscular once PRN agitation  nicotine  Polacrilex Gum 2 milliGRAM(s) Oral every 2 hours PRN nicotine  
MEDICATIONS  (PRN):  aluminum hydroxide/magnesium hydroxide/simethicone Suspension 30 milliLiter(s) Oral every 4 hours PRN Dyspepsia  ibuprofen  Tablet. 400 milliGRAM(s) Oral every 6 hours PRN Temp greater or equal to 38C (100.4F), Mild Pain (1 - 3), Moderate Pain (4 - 6)  LORazepam     Tablet 0.5 milliGRAM(s) Oral every 6 hours PRN anxiety  LORazepam   Injectable 2 milliGRAM(s) IntraMuscular once PRN agitation  nicotine  Polacrilex Gum 2 milliGRAM(s) Oral every 2 hours PRN nicotine  
MEDICATIONS  (PRN):  aluminum hydroxide/magnesium hydroxide/simethicone Suspension 30 milliLiter(s) Oral every 4 hours PRN Dyspepsia  LORazepam     Tablet 0.5 milliGRAM(s) Oral every 6 hours PRN anxiety  LORazepam   Injectable 2 milliGRAM(s) IntraMuscular once PRN agitation  nicotine  Polacrilex Gum 2 milliGRAM(s) Oral every 2 hours PRN nicotine  
MEDICATIONS  (PRN):  aluminum hydroxide/magnesium hydroxide/simethicone Suspension 30 milliLiter(s) Oral every 4 hours PRN Dyspepsia  ibuprofen  Tablet. 400 milliGRAM(s) Oral every 6 hours PRN Temp greater or equal to 38C (100.4F), Mild Pain (1 - 3), Moderate Pain (4 - 6)  LORazepam     Tablet 0.5 milliGRAM(s) Oral every 6 hours PRN anxiety  LORazepam   Injectable 2 milliGRAM(s) IntraMuscular once PRN agitation  nicotine  Polacrilex Gum 2 milliGRAM(s) Oral every 2 hours PRN nicotine  
MEDICATIONS  (PRN):  aluminum hydroxide/magnesium hydroxide/simethicone Suspension 30 milliLiter(s) Oral every 4 hours PRN Dyspepsia  ibuprofen  Tablet. 400 milliGRAM(s) Oral every 6 hours PRN Temp greater or equal to 38C (100.4F), Mild Pain (1 - 3), Moderate Pain (4 - 6)  LORazepam     Tablet 0.5 milliGRAM(s) Oral every 6 hours PRN anxiety  LORazepam   Injectable 2 milliGRAM(s) IntraMuscular once PRN agitation  nicotine  Polacrilex Gum 2 milliGRAM(s) Oral every 2 hours PRN nicotine

## 2021-10-07 NOTE — BH INPATIENT PSYCHIATRY PROGRESS NOTE - NSDCCRITERIA_PSY_ALL_CORE
Euthymic mood with no SI

## 2021-10-07 NOTE — BH INPATIENT PSYCHIATRY PROGRESS NOTE - NSBHINPTBILLING_PSY_ALL_CORE
33047 - Inpatient Low Complexity
56970 - Inpatient Low Complexity
14328 - Inpatient Low Complexity
39585 - Hospital Discharge Day Management; 30 min or less
11961 - Inpatient Moderate Complexity
57800 - Inpatient Low Complexity
84417 - Inpatient Low Complexity
29591 - Inpatient Low Complexity

## 2021-10-07 NOTE — BH INPATIENT PSYCHIATRY PROGRESS NOTE - NSBHASSESSSUMMFT_PSY_ALL_CORE
The patient continues to show improvement in depression.  She continues to have some intermittent passive suicidal ideation.  Behavior well controlled.  She has been engaging well on the unit.  She has been compliant with medications, tolerating them well.  -Continue Lexapro 15mg daily  -Continue Trazodone 50mg hs  -Group and individual therapy
The patient reports improved mood although with passive suicidal ideation.  She has been engaging well on the unit, and is starting to show some improvement.  She has been compliant with medications, tolerating them well.  -c/w Lexapro 15mg daily  -Continue Trazodone 50mg hs  -Group and individual therapy
The patient has continued to show improvement in symptoms.  She states her depression is much improved, and she denies any significant anxiety.  She denies any active SI, and her behavior has been well controlled.  The patient has been sleeping well.  The patient has been fully engaged in treatment on the unit, compliant with medications, and is looking forward to continuing care as an outpatient.  The patient has support from her family, who are also protective factors for her.  She is looking forward to completing her GED.  She was able to safety plan appropriately.  The patient’s risk cannot be further decreased with continued inpatient hospitalization.  She is no longer an acute danger to herself or others, and is stable for discharge home.  -Continue Lexapro 15mg daily  -Continue Trazodone 50mg hs  -Discharge home today
The patient reports improved mood although with passive suicidal ideation.  She has been engaging well on the unit, and is starting to show some improvement.  She has been compliant with medications, tolerating them well.  -Increase to Lexapro 15mg daily  -Continue Trazodone 50mg hs  -Group and individual therapy
The patient continues to be depressed, with suicidal ideation, without plan or intent.  She has been engaging well on the unit.  She has been compliant with medications, tolerating them well.  -Continue Lexapro 10mg daily  -Continue Trazodone 50mg hs  -Group and individual therapy
The patient continues to show improvement in depression.  She continues to have some intermittent passive suicidal ideation.  She has been engaging well on the unit, and is starting to show some improvement.  She has been compliant with medications, tolerating them well.  -Continue Lexapro 15mg daily  -Continue Trazodone 50mg hs  -Group and individual therapy
The patient continues to be depressed, with suicidal ideation, without plan or intent.  She has been engaging well on the unit, and is starting to show some improvement.  She has been compliant with medications, tolerating them well.  -Continue Lexapro 10mg daily  -Continue Trazodone 50mg hs  -Group and individual therapy
The patient reports improving mood although with passive suicidal ideation.  She has been engaging well on the unit, and is starting to show some improvement.  She has been compliant with medications, tolerating them well.  -Continue Lexapro 15mg daily  -Continue Trazodone 50mg hs  -Group and individual therapy

## 2021-10-07 NOTE — BH INPATIENT PSYCHIATRY PROGRESS NOTE - NSBHMETABOLIC_PSY_ALL_CORE_FT
BMI: BMI (kg/m2): 32.4 (09-28-21 @ 17:07)  HbA1c:   Glucose:   BP: 119/67 (10-03-21 @ 23:20) (119/67 - 137/70)  Lipid Panel: 
BMI: BMI (kg/m2): 32.4 (09-28-21 @ 17:07)  HbA1c:   Glucose:   BP: 127/79 (10-07-21 @ 07:57) (127/79 - 135/72)  Lipid Panel: 
BMI: BMI (kg/m2): 32.4 (09-28-21 @ 17:07)  HbA1c:   Glucose:   BP: 119/67 (10-03-21 @ 23:20) (119/67 - 137/70)  Lipid Panel: 
BMI: BMI (kg/m2): 32.4 (09-28-21 @ 17:07)  HbA1c:   Glucose:   BP: 130/73 (10-03-21 @ 08:08) (123/69 - 130/73)  Lipid Panel: 
BMI: BMI (kg/m2): 32.4 (09-28-21 @ 17:07)  HbA1c:   Glucose:   BP: 124/61 (10-02-21 @ 07:54) (110/66 - 128/76)  Lipid Panel: 
BMI: BMI (kg/m2): 32.4 (09-28-21 @ 17:07)  HbA1c:   Glucose:   BP: 128/74 (10-01-21 @ 09:52) (110/66 - 132/79)  Lipid Panel: 
BMI: BMI (kg/m2): 32.4 (09-28-21 @ 17:07)  HbA1c:   Glucose:   BP: 128/76 (09-30-21 @ 09:57) (110/66 - 132/79)  Lipid Panel: 
BMI: BMI (kg/m2): 32.4 (09-28-21 @ 17:07)  HbA1c:   Glucose:   BP: 135/72 (10-06-21 @ 08:03) (119/67 - 137/70)  Lipid Panel:

## 2021-10-07 NOTE — BH INPATIENT PSYCHIATRY DISCHARGE NOTE - HOSPITAL COURSE
The patient was admitted to the unit where she continued to complain of depression and suicidal ideation.  The patient felt her previous medications had been helpful.  She was therefore restarted on Lexapro which was increased to 15mg daily.  Trazodone 50mg hs and melatonin 3mg hs were added for insomnia with good effect.  She tolerated all medications well.  The patient also attended group and individual therapy, which she found to be helpful.    The patient accommodated well to the unit and showed good improvement in symptoms.  She started to feel less depressed, more hopeful, denied any anxiety, and started to deny any SI.  Her behavior was always well controlled.  The patient was visible on the unit, social with select peers, and actively engaged in groups.  She found the learned coping skills to be very helpful.  The patient was sleeping and eating well, and took care of her ADLs.    Of note, the patient was continued on Methimazole 40mg daily for hyperthyroidism, and metoprolol bid.  Her pulse was found to be low on several occasions, and metoprolol was therefore lowered to 12.5mg bid with stabilization of vital signs.  She will follow-up with endocrinology as an outpatient.    On the day of discharge, the patient has continued to show improvement in symptoms.  She states her depression is much improved, and she denies any significant anxiety.  She denies any SI, and her behavior has been well controlled.  The patient has been sleeping well.  The patient has been fully engaged in treatment on the unit, compliant with medications, and is looking forward to continuing care as an outpatient.  The patient has support from her family, who are also protective factors for her.  She is looking forward to completing her GED.  She was able to safety plan appropriately.  The patient’s risk cannot be further decreased with continued inpatient hospitalization.  She is no longer an acute danger to herself or others, and is stable for discharge home.    Discharge medications: Lexapro 15mg daily, melatonin 3mg hs, trazodone 50mg hs, methimazole 40mg daily, metoprolol 12.5mg bid

## 2021-10-07 NOTE — BH INPATIENT PSYCHIATRY DISCHARGE NOTE - OTHER PAST PSYCHIATRIC HISTORY (INCLUDE DETAILS REGARDING ONSET, COURSE OF ILLNESS, INPATIENT/OUTPATIENT TREATMENT)
Hx of 3 past inpatient psychiatric hospitalization, hx of suicide attempt in 2012 by attempt to hang self (pt used belt and tied to shower curtain chikis; placed around neck for 1 minute but then stopped); denies hx of cutting or other self-injurious behavior.  Patient first started seeing a therapist around Sept. 2011 and stopped in 2013.  She first saw a psychiatrist in Sept. 2012 and stopped in Sept. 2015 because she thought she was feeling better and no longer needed Lexapro.  Patient first started medication (Zoloft) in Oct. 2012. Discharged from [facility] in Nov 2019. most recently saw Dr. Myles, but stopped her home psychiatric medications since January 2020.  has not been in treatment since march 2020.

## 2021-10-07 NOTE — BH INPATIENT PSYCHIATRY DISCHARGE NOTE - NSBHMETABOLIC_PSY_ALL_CORE_FT
BMI: BMI (kg/m2): 32.4 (09-28-21 @ 17:07)  HbA1c:   Glucose:   BP: 127/79 (10-07-21 @ 07:57) (127/79 - 135/72)  Lipid Panel:

## 2021-10-07 NOTE — BH INPATIENT PSYCHIATRY PROGRESS NOTE - NSBHFUPINTERVALHXFT_PSY_A_CORE
Patient seen for follow up for depression, chart reviewed, and case discussed with nursing.  No events reported overnight.  The patient states she is feeling better.  She feels depression and anxiety are improving.  She continues to admits to passive SI, but denies active SI in the hospital, and behavior has been well controlled. She reports remorse for her SA prior to hospitalization. The patient has been visible on the unit, social with select peers, and is attending groups, which she feels have been helpful. The patient reports eating and sleeping well.  She has been compliant with medications, tolerating them well.
Patient seen for follow up for depression, chart reviewed, and case discussed with treatment team.  No events reported overnight.  The patient continues to be depressed, and admits to passive SI.  She denies active SI in the hospital, and behavior has been well controlled.  The patient states she is getting used to the unit, and has been visible and attending groups.  She feels the groups have been helpful.  The patient reports eating and sleeping well.  She has been compliant with medications, tolerating them well.
Patient seen for follow up for depression, chart reviewed, and case discussed with nursing.  No events reported overnight.  The patient continues to show improvement in symptoms.  Her depression has been showing improvement.  She admits to rare intrusive SI, but denies any active SI in the hospital, and behavior has been well controlled. The patient has been visible on the unit, social with select peers, and is attending groups, which she feels have been helpful. The patient reports eating and sleeping well.  She has been compliant with medications, tolerating them well.
Patient seen for follow up for depression, chart reviewed, and case discussed with nursing.  No events reported overnight.  The patient states she is feeling "okay", a little more down than yesterday because of a new roommate situation but is willing to tolerate the change and will speak with AM team about changes.  She continues to admit to passive SI, but denies active SI in the hospital, and behavior has been well controlled. The patient has been visible on the unit, social with select peers, and is attending groups, which she feels have been helpful. The patient reports eating and sleeping well.  She has been compliant with medications, tolerating them well, including the increase to Lexapro 15mg po.
Patient seen for follow up for depression, chart reviewed, and case discussed with treatment team.  No events reported overnight.  The patient states she is starting to feel a little better.  She feels depression and anxiety are improving.  She continues to admits to passive SI, but denies active SI in the hospital, and behavior has been well controlled.  The patient has been visible on the unit, social with select peers, and is attending groups, which she feels have been helpful.  She also met for individual session with psychology which went well.  The patient reports eating and sleeping well.  She has been compliant with medications, tolerating them well.
Patient seen for follow up for depression, chart reviewed, and case discussed with nursing.  No events reported overnight.  The patient continues to show improvement in symptoms.  She states her depression continues to improve.  She admits to some rare brief intrusive SI, but denies any active SI, and behavior has been well controlled. The patient has been visible on the unit, social with select peers, and is attending groups, which she feels have been helpful. The patient reports eating and sleeping well.  She has been compliant with medications, tolerating them well.
Patient seen for follow up for depression, chart reviewed, and case discussed with nursing.  No events reported overnight.  The patient continues to do well on the unit.  She states her depression continues to improve.  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  She continues to be have rare brief intrusive SI, but denies any active SI, and behavior has been well controlled. The patient has been visible on the unit, social with select peers, and is attending groups, which she feels have been helpful. The patient reports eating and sleeping well.  She has been compliant with medications, tolerating them well.
Patient seen for follow up for depression, chart reviewed, and case discussed with nursing.  No events reported overnight.  The patient states she is feeling better overall.  Her depression has been showing improvement.  She continues to have some passive SI, but denies active SI in the hospital, and behavior has been well controlled. She is more hopeful however.  The patient has been visible on the unit, social with select peers, and is attending groups, which she feels have been helpful. The patient reports eating and sleeping well.  She has been compliant with medications, tolerating them well.

## 2021-10-07 NOTE — BH INPATIENT PSYCHIATRY PROGRESS NOTE - NSTXDEPRESGOAL_PSY_ALL_CORE
Report journaling 5 counter-statements to negative predictions/self-image daily

## 2021-10-07 NOTE — BH INPATIENT PSYCHIATRY PROGRESS NOTE - NSTXSUICIDGOAL_PSY_ALL_CORE
Be able to state 3 reasons for living
Be able to state 3 reasons for living
Will identify thoughts associated with suicidal ideation
Be able to state 3 reasons for living

## 2021-10-07 NOTE — BH INPATIENT PSYCHIATRY PROGRESS NOTE - NSBHCHARTREVIEWVS_PSY_A_CORE FT
Vital Signs Last 24 Hrs  T(C): 36.6 (10-07-21 @ 07:57), Max: 36.6 (10-06-21 @ 20:33)  T(F): 97.8 (10-07-21 @ 07:57), Max: 97.9 (10-06-21 @ 20:33)  HR: --  BP: 127/79 (10-07-21 @ 07:57) (127/79 - 127/79)  BP(mean): 77 (10-07-21 @ 07:57) (77 - 77)  RR: --  SpO2: --    Orthostatic VS  10-06-21 @ 20:33  Lying BP: --/-- HR: --  Sitting BP: 132/63 HR: 60  Standing BP: --/-- HR: --  Site: --  Mode: --  Orthostatic VS  10-05-21 @ 20:37  Lying BP: --/-- HR: --  Sitting BP: --/-- HR: --  Standing BP: 135/82 HR: 60  Site: --  Mode: --  
Vital Signs Last 24 Hrs  T(C): 36.3 (10-01-21 @ 09:52), Max: 36.7 (09-30-21 @ 20:55)  T(F): 97.4 (10-01-21 @ 09:52), Max: 98 (09-30-21 @ 20:55)  HR: 88 (10-01-21 @ 09:52) (63 - 88)  BP: 128/74 (10-01-21 @ 09:52) (123/69 - 128/74)  BP(mean): --  RR: 18 (09-30-21 @ 20:55) (18 - 18)  SpO2: --    
Vital Signs Last 24 Hrs  T(C): 36.2 (10-06-21 @ 08:03), Max: 36.7 (10-05-21 @ 20:37)  T(F): 97.2 (10-06-21 @ 08:03), Max: 98.1 (10-05-21 @ 20:37)  HR: 66 (10-06-21 @ 08:03) (66 - 66)  BP: 135/72 (10-06-21 @ 08:03) (135/72 - 135/72)  BP(mean): --  RR: --  SpO2: --    Orthostatic VS  10-05-21 @ 20:37  Lying BP: --/-- HR: --  Sitting BP: --/-- HR: --  Standing BP: 135/82 HR: 60  Site: --  Mode: --  Orthostatic VS  10-05-21 @ 09:24  Lying BP: --/-- HR: --  Sitting BP: --/-- HR: --  Standing BP: 140/75 HR: 90  Site: --  Mode: --  Orthostatic VS  10-05-21 @ 08:04  Lying BP: --/-- HR: --  Sitting BP: 133/64 HR: 73  Standing BP: --/-- HR: --  Site: --  Mode: --  Orthostatic VS  10-04-21 @ 21:00  Lying BP: --/-- HR: --  Sitting BP: --/-- HR: --  Standing BP: --/-- HR: 71  Site: --  Mode: --  
Vital Signs Last 24 Hrs  T(C): 36.7 (10-03-21 @ 08:08), Max: 36.9 (10-02-21 @ 21:30)  T(F): 98 (10-03-21 @ 08:08), Max: 98.4 (10-02-21 @ 21:30)  HR: 84 (10-03-21 @ 08:08) (60 - 84)  BP: 130/73 (10-03-21 @ 08:08) (129/62 - 130/73)  BP(mean): --  RR: --  SpO2: --    
Vital Signs Last 24 Hrs  T(C): 36.1 (10-05-21 @ 08:04), Max: 36.8 (10-04-21 @ 21:00)  T(F): 96.9 (10-05-21 @ 08:04), Max: 98.3 (10-04-21 @ 21:00)  HR: --  BP: --  BP(mean): --  RR: --  SpO2: --    Orthostatic VS  10-05-21 @ 09:24  Lying BP: --/-- HR: --  Sitting BP: --/-- HR: --  Standing BP: 140/75 HR: 90  Site: --  Mode: --  Orthostatic VS  10-05-21 @ 08:04  Lying BP: --/-- HR: --  Sitting BP: 133/64 HR: 73  Standing BP: --/-- HR: --  Site: --  Mode: --  Orthostatic VS  10-04-21 @ 21:00  Lying BP: --/-- HR: --  Sitting BP: --/-- HR: --  Standing BP: --/-- HR: 71  Site: --  Mode: --  Orthostatic VS  10-04-21 @ 06:28  Lying BP: --/-- HR: --  Sitting BP: 119/67 HR: 48  Standing BP: --/-- HR: --  Site: --  Mode: --  
Vital Signs Last 24 Hrs  T(C): 36.7 (10-04-21 @ 06:28), Max: 36.7 (10-03-21 @ 21:51)  T(F): 98.1 (10-04-21 @ 06:28), Max: 98.1 (10-04-21 @ 06:28)  HR: 51 (10-03-21 @ 23:20) (50 - 54)  BP: 119/67 (10-03-21 @ 23:20) (119/67 - 137/70)  BP(mean): --  RR: 16 (10-04-21 @ 06:28) (16 - 16)  SpO2: --    Orthostatic VS  10-04-21 @ 06:28  Lying BP: --/-- HR: --  Sitting BP: 119/67 HR: 48  Standing BP: --/-- HR: --  Site: --  Mode: --  
Vital Signs Last 24 Hrs  T(C): 36.8 (10-02-21 @ 07:54), Max: 36.8 (10-02-21 @ 07:54)  T(F): 98.3 (10-02-21 @ 07:54), Max: 98.3 (10-02-21 @ 07:54)  HR: --  BP: 124/61 (10-02-21 @ 07:54) (124/61 - 124/61)  BP(mean): 57 (10-02-21 @ 07:54) (57 - 57)  RR: --  SpO2: --    
Vital Signs Last 24 Hrs  T(C): 36.4 (09-30-21 @ 09:57), Max: 36.9 (09-29-21 @ 20:31)  T(F): 97.6 (09-30-21 @ 09:57), Max: 98.5 (09-29-21 @ 20:31)  HR: 90 (09-30-21 @ 09:57) (90 - 90)  BP: 128/76 (09-30-21 @ 09:57) (110/66 - 128/76)  BP(mean): 66 (09-29-21 @ 20:31) (66 - 66)  RR: --  SpO2: --    Orthostatic VS  09-29-21 @ 06:04  Lying BP: --/-- HR: --  Sitting BP: 116/66 HR: 55  Standing BP: 115/69 HR: 76  Site: --  Mode: --  Orthostatic VS  09-28-21 @ 17:07  Lying BP: --/-- HR: --  Sitting BP: 137/85 HR: 76  Standing BP: 134/87 HR: 99  Site: --  Mode: --

## 2021-10-07 NOTE — BH INPATIENT PSYCHIATRY DISCHARGE NOTE - NSDCMRMEDTOKEN_GEN_ALL_CORE_FT
escitalopram 10 mg oral tablet: 1 tab(s) orally once a day MDD:toal 15mg daily  Lexapro 5 mg oral tablet: 1 tab(s) orally once a day   melatonin 3 mg oral tablet: 1 tab(s) orally once a day (at bedtime)  methIMAzole 10 mg oral tablet: 4 tab(s) orally once a day  metoprolol tartrate 25 mg oral tablet: 0.5 tab(s) orally 2 times a day   traZODone 50 mg oral tablet: 1 tab(s) orally once a day (at bedtime)

## 2021-10-08 DIAGNOSIS — Z71.89 OTHER SPECIFIED COUNSELING: ICD-10-CM

## 2021-10-12 ENCOUNTER — OUTPATIENT (OUTPATIENT)
Dept: OUTPATIENT SERVICES | Facility: HOSPITAL | Age: 21
LOS: 1 days | Discharge: PSYCHIATRIC OR SUBSTANCE USE FACILITY | End: 2021-10-12
Payer: MEDICAID

## 2021-10-12 PROCEDURE — 99214 OFFICE O/P EST MOD 30 MIN: CPT | Mod: 95

## 2021-10-18 PROCEDURE — 90791 PSYCH DIAGNOSTIC EVALUATION: CPT | Mod: 95

## 2021-11-10 DIAGNOSIS — F33.1 MAJOR DEPRESSIVE DISORDER, RECURRENT, MODERATE: ICD-10-CM

## 2021-11-11 DIAGNOSIS — F43.10 POST-TRAUMATIC STRESS DISORDER, UNSPECIFIED: ICD-10-CM

## 2021-11-23 PROCEDURE — 99214 OFFICE O/P EST MOD 30 MIN: CPT

## 2021-11-26 PROCEDURE — 99214 OFFICE O/P EST MOD 30 MIN: CPT | Mod: 95

## 2021-12-01 PROCEDURE — 90832 PSYTX W PT 30 MINUTES: CPT | Mod: 95

## 2021-12-01 PROCEDURE — G9005: CPT

## 2021-12-02 NOTE — ED ADULT NURSE NOTE - NSFALLRSKASSESASSIST_ED_ALL_ED
Enbrel  Received: Today  MICHELLE Vizcaino Nurse Msg Pool  Enbrel 50mg -  Approved - Awaiting patient call back regarding copay / pt needs to receive Co-pay card in the mail, and call us with the information.     Authorization Number: [id number]   Valid from 12/01/2021 to 12/01/2022     Need Co-pay Card information     Pharmacy Coverage [employer]   Patient's Co-Pay: $35.00 after copay     Co pay Assistance Information   Patient enrolled in drug copay card.   Copay after assistance is: $5.00.     Additional Information:   Spoke to patient they are aware of approval and filling pharmacy.     Left message:     Joslyn Connell   12/2/21     
no

## 2021-12-02 NOTE — ED BEHAVIORAL HEALTH ASSESSMENT NOTE - SUICIDE PROTECTIVE FACTORS
No cyanosis, no pallor, no jaundice, no rash
Responsibility to family and others/Supportive social network of family or friends/Has future plans/Positive therapeutic relationships/Identifies reasons for living

## 2021-12-08 PROCEDURE — 90832 PSYTX W PT 30 MINUTES: CPT | Mod: 95

## 2021-12-15 PROCEDURE — 90832 PSYTX W PT 30 MINUTES: CPT | Mod: 95

## 2021-12-17 PROCEDURE — 99214 OFFICE O/P EST MOD 30 MIN: CPT | Mod: 95

## 2022-01-11 PROCEDURE — 99214 OFFICE O/P EST MOD 30 MIN: CPT | Mod: 95

## 2022-01-12 PROCEDURE — 90832 PSYTX W PT 30 MINUTES: CPT | Mod: 95

## 2022-01-19 PROCEDURE — 90832 PSYTX W PT 30 MINUTES: CPT | Mod: 95

## 2022-01-25 PROCEDURE — 99214 OFFICE O/P EST MOD 30 MIN: CPT | Mod: 95

## 2022-02-11 PROCEDURE — 99214 OFFICE O/P EST MOD 30 MIN: CPT | Mod: 95

## 2022-02-18 PROCEDURE — 99214 OFFICE O/P EST MOD 30 MIN: CPT | Mod: 95

## 2022-03-09 PROCEDURE — 90832 PSYTX W PT 30 MINUTES: CPT | Mod: 95

## 2022-03-11 PROCEDURE — 99214 OFFICE O/P EST MOD 30 MIN: CPT | Mod: 95

## 2022-03-14 ENCOUNTER — EMERGENCY (EMERGENCY)
Facility: HOSPITAL | Age: 22
LOS: 1 days | Discharge: HOME | End: 2022-03-14
Attending: EMERGENCY MEDICINE
Payer: MEDICAID

## 2022-03-14 VITALS
DIASTOLIC BLOOD PRESSURE: 72 MMHG | HEIGHT: 66 IN | HEART RATE: 75 BPM | RESPIRATION RATE: 18 BRPM | SYSTOLIC BLOOD PRESSURE: 108 MMHG | OXYGEN SATURATION: 98 % | WEIGHT: 195.11 LBS | TEMPERATURE: 97 F

## 2022-03-14 PROCEDURE — 93010 ELECTROCARDIOGRAM REPORT: CPT

## 2022-03-14 PROCEDURE — 99285 EMERGENCY DEPT VISIT HI MDM: CPT | Mod: 25

## 2022-03-14 NOTE — ED ADULT TRIAGE NOTE - CHIEF COMPLAINT QUOTE
Pt reports unwitnessed fall, and doesn't remember if it was mechanical or syncopal. Pt hit right side of head with laceration present, and reports drinking Ijeoma today to relieve tooth pain. Pt presents pale and reports blurry vision and headache s/p fall. Hx anemia.

## 2022-03-15 VITALS
SYSTOLIC BLOOD PRESSURE: 111 MMHG | HEART RATE: 64 BPM | OXYGEN SATURATION: 99 % | TEMPERATURE: 97 F | RESPIRATION RATE: 16 BRPM | DIASTOLIC BLOOD PRESSURE: 74 MMHG

## 2022-03-15 LAB
ALBUMIN SERPL ELPH-MCNC: 5.3 G/DL — HIGH (ref 3.3–5)
ALP SERPL-CCNC: 117 U/L — SIGNIFICANT CHANGE UP (ref 40–120)
ALT FLD-CCNC: 22 U/L — SIGNIFICANT CHANGE UP (ref 10–45)
ANION GAP SERPL CALC-SCNC: 16 MMOL/L — SIGNIFICANT CHANGE UP (ref 5–17)
APTT BLD: 34.4 SEC — SIGNIFICANT CHANGE UP (ref 27.5–35.5)
AST SERPL-CCNC: 35 U/L — SIGNIFICANT CHANGE UP (ref 10–40)
BASOPHILS # BLD AUTO: 0.22 K/UL — HIGH (ref 0–0.2)
BASOPHILS NFR BLD AUTO: 2.2 % — HIGH (ref 0–2)
BILIRUB SERPL-MCNC: 0.2 MG/DL — SIGNIFICANT CHANGE UP (ref 0.2–1.2)
BUN SERPL-MCNC: 12 MG/DL — SIGNIFICANT CHANGE UP (ref 7–23)
CALCIUM SERPL-MCNC: 9.4 MG/DL — SIGNIFICANT CHANGE UP (ref 8.4–10.5)
CHLORIDE SERPL-SCNC: 100 MMOL/L — SIGNIFICANT CHANGE UP (ref 96–108)
CO2 SERPL-SCNC: 21 MMOL/L — LOW (ref 22–31)
CREAT SERPL-MCNC: 1.02 MG/DL — SIGNIFICANT CHANGE UP (ref 0.5–1.3)
EGFR: 80 ML/MIN/1.73M2 — SIGNIFICANT CHANGE UP
EOSINOPHIL # BLD AUTO: 0.14 K/UL — SIGNIFICANT CHANGE UP (ref 0–0.5)
EOSINOPHIL NFR BLD AUTO: 1.4 % — SIGNIFICANT CHANGE UP (ref 0–6)
GLUCOSE SERPL-MCNC: 93 MG/DL — SIGNIFICANT CHANGE UP (ref 70–99)
HCG SERPL-ACNC: <2 MIU/ML — SIGNIFICANT CHANGE UP
HCT VFR BLD CALC: 42.4 % — SIGNIFICANT CHANGE UP (ref 34.5–45)
HGB BLD-MCNC: 13.2 G/DL — SIGNIFICANT CHANGE UP (ref 11.5–15.5)
IMM GRANULOCYTES NFR BLD AUTO: 0.4 % — SIGNIFICANT CHANGE UP (ref 0–1.5)
INR BLD: 1.09 RATIO — SIGNIFICANT CHANGE UP (ref 0.88–1.16)
LYMPHOCYTES # BLD AUTO: 2.79 K/UL — SIGNIFICANT CHANGE UP (ref 1–3.3)
LYMPHOCYTES # BLD AUTO: 28.5 % — SIGNIFICANT CHANGE UP (ref 13–44)
MCHC RBC-ENTMCNC: 24 PG — LOW (ref 27–34)
MCHC RBC-ENTMCNC: 31.1 GM/DL — LOW (ref 32–36)
MCV RBC AUTO: 77.1 FL — LOW (ref 80–100)
MONOCYTES # BLD AUTO: 0.42 K/UL — SIGNIFICANT CHANGE UP (ref 0–0.9)
MONOCYTES NFR BLD AUTO: 4.3 % — SIGNIFICANT CHANGE UP (ref 2–14)
NEUTROPHILS # BLD AUTO: 6.19 K/UL — SIGNIFICANT CHANGE UP (ref 1.8–7.4)
NEUTROPHILS NFR BLD AUTO: 63.2 % — SIGNIFICANT CHANGE UP (ref 43–77)
NRBC # BLD: 0 /100 WBCS — SIGNIFICANT CHANGE UP (ref 0–0)
PLATELET # BLD AUTO: 315 K/UL — SIGNIFICANT CHANGE UP (ref 150–400)
POTASSIUM SERPL-MCNC: 5.1 MMOL/L — SIGNIFICANT CHANGE UP (ref 3.5–5.3)
POTASSIUM SERPL-SCNC: 5.1 MMOL/L — SIGNIFICANT CHANGE UP (ref 3.5–5.3)
PROT SERPL-MCNC: 8.5 G/DL — HIGH (ref 6–8.3)
PROTHROM AB SERPL-ACNC: 12.6 SEC — SIGNIFICANT CHANGE UP (ref 10.5–13.4)
RBC # BLD: 5.5 M/UL — HIGH (ref 3.8–5.2)
RBC # FLD: 17 % — HIGH (ref 10.3–14.5)
SODIUM SERPL-SCNC: 137 MMOL/L — SIGNIFICANT CHANGE UP (ref 135–145)
WBC # BLD: 9.8 K/UL — SIGNIFICANT CHANGE UP (ref 3.8–10.5)
WBC # FLD AUTO: 9.8 K/UL — SIGNIFICANT CHANGE UP (ref 3.8–10.5)

## 2022-03-15 PROCEDURE — 90471 IMMUNIZATION ADMIN: CPT

## 2022-03-15 PROCEDURE — 85610 PROTHROMBIN TIME: CPT

## 2022-03-15 PROCEDURE — 93005 ELECTROCARDIOGRAM TRACING: CPT

## 2022-03-15 PROCEDURE — 90715 TDAP VACCINE 7 YRS/> IM: CPT

## 2022-03-15 PROCEDURE — 85025 COMPLETE CBC W/AUTO DIFF WBC: CPT

## 2022-03-15 PROCEDURE — 70450 CT HEAD/BRAIN W/O DYE: CPT | Mod: 26,MA

## 2022-03-15 PROCEDURE — 86901 BLOOD TYPING SEROLOGIC RH(D): CPT

## 2022-03-15 PROCEDURE — 86900 BLOOD TYPING SEROLOGIC ABO: CPT

## 2022-03-15 PROCEDURE — 86850 RBC ANTIBODY SCREEN: CPT

## 2022-03-15 PROCEDURE — 84702 CHORIONIC GONADOTROPIN TEST: CPT

## 2022-03-15 PROCEDURE — 80053 COMPREHEN METABOLIC PANEL: CPT

## 2022-03-15 PROCEDURE — 70450 CT HEAD/BRAIN W/O DYE: CPT | Mod: MA

## 2022-03-15 PROCEDURE — 99285 EMERGENCY DEPT VISIT HI MDM: CPT | Mod: 25

## 2022-03-15 PROCEDURE — 72125 CT NECK SPINE W/O DYE: CPT | Mod: 26,MA

## 2022-03-15 PROCEDURE — 72125 CT NECK SPINE W/O DYE: CPT | Mod: MA

## 2022-03-15 PROCEDURE — 85730 THROMBOPLASTIN TIME PARTIAL: CPT

## 2022-03-15 RX ORDER — ACETAMINOPHEN 500 MG
650 TABLET ORAL ONCE
Refills: 0 | Status: COMPLETED | OUTPATIENT
Start: 2022-03-15 | End: 2022-03-15

## 2022-03-15 RX ORDER — SODIUM CHLORIDE 9 MG/ML
1000 INJECTION INTRAMUSCULAR; INTRAVENOUS; SUBCUTANEOUS ONCE
Refills: 0 | Status: COMPLETED | OUTPATIENT
Start: 2022-03-15 | End: 2022-03-15

## 2022-03-15 RX ORDER — TETANUS TOXOID, REDUCED DIPHTHERIA TOXOID AND ACELLULAR PERTUSSIS VACCINE, ADSORBED 5; 2.5; 8; 8; 2.5 [IU]/.5ML; [IU]/.5ML; UG/.5ML; UG/.5ML; UG/.5ML
0.5 SUSPENSION INTRAMUSCULAR ONCE
Refills: 0 | Status: COMPLETED | OUTPATIENT
Start: 2022-03-15 | End: 2022-03-15

## 2022-03-15 RX ADMIN — TETANUS TOXOID, REDUCED DIPHTHERIA TOXOID AND ACELLULAR PERTUSSIS VACCINE, ADSORBED 0.5 MILLILITER(S): 5; 2.5; 8; 8; 2.5 SUSPENSION INTRAMUSCULAR at 01:45

## 2022-03-15 RX ADMIN — Medication 650 MILLIGRAM(S): at 01:45

## 2022-03-15 RX ADMIN — SODIUM CHLORIDE 1000 MILLILITER(S): 9 INJECTION INTRAMUSCULAR; INTRAVENOUS; SUBCUTANEOUS at 02:32

## 2022-03-15 NOTE — ED PROVIDER NOTE - CLINICAL SUMMARY MEDICAL DECISION MAKING FREE TEXT BOX
Rafiq-PGY3: 22 year old female with PMH hyperthyroidism, depression, anxiety, BPD presents with fall 1 hour prior to arrival. Pt reports chronic teeth pain and is following with [facility] dental clinic, reports drinking 2 pints of elier today. Denies daily etoh use. Per father, he heard her fall at ground level and noted wound to right scalp. Pt cannot remember fall, unclear if she fainted. Pt reports mild right sided headache, but denies any fevers, chest pain, shortness of breath, abdominal pain, vomiting, diarrhea, bloody stools, black tarry stools, dysuria, vision change, numbness, weakness, or rash. Denies any aspirin or AC use. Denies any SI/HI. Pt with scalp wound s/p fall, ?syncope, clinically intoxicated, no other complaints. No evidence of a cardiac arrythmia such as Brugada, WPW, HOCM, long or short QT.  Neurologic exam is nonfocal, not c/w CVA or primary neurologic abnormality. Will obtain labs r/o anemia r/o pregnancy, imaging r/o ICH r/o fx, symptomatic treatment with dispo pending workup.

## 2022-03-15 NOTE — ED PROVIDER NOTE - NSFOLLOWUPINSTRUCTIONS_ED_ALL_ED_FT
Rest and stay well hydrated.    Follow up with your primary care physician in 1-3 days.    Take over the counter acetaminophen (Tylenol) 650-1000 mg every 4-6 hours as needed for pain. Do not take more than 3000 mg in a 24 hour period. Be aware many over the counter and prescription medications also contain acetaminophen (Tylenol).     SEEK IMMEDIATE MEDICAL CARE IF YOU HAVE ANY OF THE FOLLOWING SYMPTOMS: swelling around the wound, worsening pain, drainage from the wound, red streaking going away from your wound, or discoloration of skin near the laceration.

## 2022-03-15 NOTE — ED PROVIDER NOTE - NS ED ROS FT
Review of Systems    Constitutional: (-) fever, (-) chills, (-) fatigue  Cardiovascular: (-) chest pain, (+) syncope  Respiratory: (-) cough, (-) shortness of breath  Gastrointestinal: (-) vomiting, (-) diarrhea, (-) abdominal pain  Musculoskeletal: (-) neck pain, (-) back pain, (-) joint pain  Integumentary: (-) rash, (-) edema, (+) wound  Neurological: (-) headache, (-) altered mental status    Except as documented in the HPI, all other systems are negative.

## 2022-03-15 NOTE — ED PROVIDER NOTE - PATIENT PORTAL LINK FT
You can access the FollowMyHealth Patient Portal offered by [facility] by registering at the following website: [email or web address]. By joining [facility] FollowMyHealth portal, you will also be able to view your health information using other applications (apps) compatible with our system.

## 2022-03-15 NOTE — ED PROVIDER NOTE - NSICDXNOPASTSURGICALHX_GEN_ALL_ED
Heparin drip restarted per orders. Rate of 16 units/kg/hr verified with pharmacist prior to hanging. <-- Click to add NO significant Past Surgical History

## 2022-03-15 NOTE — ED PROVIDER NOTE - OBJECTIVE STATEMENT
22 year old female with PMH hyperthyroidism, depression, anxiety, BPD presents with fall 1 hour prior to arrival. Pt reports chronic teeth pain and is following with [facility] dental clinic, reports drinking 2 pints of elier today. Denies daily etoh use. Per father, he heard her fall at ground level and noted wound to right scalp. Pt cannot remember fall, unclear if she fainted. Pt reports mild right sided headache, but denies any fevers, chest pain, shortness of breath, abdominal pain, vomiting, diarrhea, bloody stools, black tarry stools, dysuria, vision change, numbness, weakness, or rash. Denies any aspirin or AC use. Denies any SI/HI. Denies any additional complaints.

## 2022-03-15 NOTE — ED ADULT NURSE NOTE - OBJECTIVE STATEMENT
patient is a 23 y/o F with hx of hyperthyroidism, depression, anemia who presents to the ED s/p fall. patient states that she has been drinking elier today "to soothe tooth pain" and she fell earlier tonight. patient does not recall if fall was mechanical or physiological. patient intoxicated at present and unreliable for history. patient has laceration noted to the right back of her head with minimal active bleeding at present. patient a&ox4, PERRL @ 3 mm. sensation intact. patient father at bedside. IV access established. MD Conroy at bedside to assess. patient pending CTs. patient updated on plan of care. comfort and safety maintained.

## 2022-03-15 NOTE — ED PROVIDER NOTE - ATTENDING CONTRIBUTION TO CARE
Afebrile. Awake and Alert. Intoxicated. Superficial abrasion left scalp. No mid-line CS TTP. Lungs CTA. Heart RRR. Abdomen soft NTND. CN II-XII grossly intact. Moves all extremities without lateralization. No deformities, abrasions, contusion to extremities or torso.    CTH/CTCS given intoxication and scalp abrasion  tdap update  Father at bedside and will look after patient after discharge  r/o sever anemia

## 2022-03-15 NOTE — ED PROVIDER NOTE - PHYSICAL EXAMINATION
CONSTITUTIONAL: non-toxic, well appearing  SKIN: no rash, no petechiae.  EYES: PERRL, EOMI, pink conjunctiva, anicteric  HEENT: tongue and uvular midline, no exudates, poor dentition, no signs of abscess, mildly dry mucous membranes. Small abrasion to right parietal scalp without active bleeding  NECK: Supple; no meningismus, no JVD  CARD: RRR, no murmurs, equal radial pulses bilaterally 2+  RESP: CTAB, no respiratory distress  ABD: Soft, non-tender, non-distended, no peritoneal signs  EXT: Normal ROM x4. No edema.   NEURO: Alert, oriented. Neuro exam nonfocal. CN II-XII intact.   PSYCH: Cooperative, appropriate.

## 2022-03-15 NOTE — ED PROVIDER NOTE - PROGRESS NOTE DETAILS
Rafiq-PGY3: pt seen and re-evaluated at bedside.  Pt states her symptoms have improved.  Pt comfortable in NAD.  Wound copiously irrigated with NS, abrasion with scab noted, no indication for primary closure. Father states he is driving pt home. Will DC with PMD follow up and return precautions, pt understood and agreeable with plan.

## 2022-03-23 PROCEDURE — 90832 PSYTX W PT 30 MINUTES: CPT | Mod: 95

## 2022-03-25 PROCEDURE — 99214 OFFICE O/P EST MOD 30 MIN: CPT | Mod: 95

## 2022-04-13 PROCEDURE — 90832 PSYTX W PT 30 MINUTES: CPT | Mod: 95

## 2022-04-18 PROCEDURE — 99214 OFFICE O/P EST MOD 30 MIN: CPT | Mod: 95

## 2022-05-09 PROCEDURE — 99214 OFFICE O/P EST MOD 30 MIN: CPT | Mod: 1L,95

## 2022-05-11 PROCEDURE — 90832 PSYTX W PT 30 MINUTES: CPT | Mod: 1L,95

## 2022-05-17 ENCOUNTER — APPOINTMENT (OUTPATIENT)
Dept: ENDOCRINOLOGY | Facility: CLINIC | Age: 22
End: 2022-05-17

## 2022-05-18 PROCEDURE — 90832 PSYTX W PT 30 MINUTES: CPT | Mod: 1L,95

## 2022-06-03 PROCEDURE — 99214 OFFICE O/P EST MOD 30 MIN: CPT | Mod: 95

## 2022-06-08 PROCEDURE — 90832 PSYTX W PT 30 MINUTES: CPT | Mod: 95

## 2022-06-15 PROCEDURE — 90832 PSYTX W PT 30 MINUTES: CPT | Mod: 95

## 2022-06-17 PROCEDURE — 99214 OFFICE O/P EST MOD 30 MIN: CPT | Mod: 95

## 2022-06-22 PROCEDURE — 90832 PSYTX W PT 30 MINUTES: CPT | Mod: 95

## 2023-01-01 NOTE — BH SOCIAL WORK INITIAL PSYCHOSOCIAL EVALUATION - NSBHLEGALRESTRAINPT_PSY_ALL_CORE
CHW made first attempt to contact [facility] regarding transportation issues. CHW left a detailed voicemail with a call back number included.     CHW to make 2nd attempt in one week.   
No

## 2023-01-27 NOTE — PATIENT PROFILE ADULT - DO YOU FEEL LIKE HURTING YOURSELF OR OTHERS?
Lexy Ortiz Patient Age: 74 year old  MESSAGE: Interpreting service used: No    Insurance on file confirmed with caller: Yes    IM/FP- Orders- Question about Current Order-     Name of order: Imaging-  Ultrasound- Area of the body needing imaging:  THYROID AND KIDNEY      Question about order: Pt is requesting information on prep for tests from Dr. Easley's office. -    Provider's home site: [place]- Connect call to [facility] queue- Route message to provider's clinical support pool    Message read back to caller for accuracy: Yes       ALLERGIES:  Clarithromycin, Clindamycin, Iodinated diagnostic agents, Iodine   (environmental or med), Moxifloxacin, Penicillins, Sulfamethoxazole-trimethoprim, Metronidazole, Aspirin, Bactrim ds, Mobic, Moxifloxacin hcl in nacl, and Naproxen-esomeprazole  Current Outpatient Medications   Medication Sig Dispense Refill   • atorvastatin (LIPITOR) 20 MG tablet TAKE ONE TABLET BY MOUTH DAILY 90 tablet 2   • pantoprazole (PROTONIX) 40 MG tablet TAKE ONE TABLET BY MOUTH DAILY 90 tablet 1   • Cholecalciferol (VITAMIN D-3 PO) Take 3,000 mg by mouth.     • nystatin (MYCOSTATIN) 279849 UNIT/GM powder Apply topically 3 times daily. 60 g 3   • calcium carbonate (OS-LASHAUN) 1250 (500 Ca) MG tablet      • cholecalciferol (VITAMIN D3) 1000 UNITS tablet Take 1,000 Units by mouth.     • fexofenadine (ALLEGRA) 180 MG tablet Take 180 mg by mouth.     • Multiple Vitamins-Minerals (CENTRUM SILVER ULTRA WOMENS) tablet      • Multiple Vitamins-Minerals (PRESERVISION AREDS 2) Cap        No current facility-administered medications for this visit.     PHARMACY to use: please request preferred pharmacy from pt if needed             Pharmacy preference(s) on file:   [id number] - [place], [place] - [address]  [address] [address]  Phone: [phone] Fax: [phone]      CALL BACK INFO: DO NOT LEAVE A MESSAGE - contact patient directly      PCP: Tere Easley DO         INS: Payor:  MEDICARE / Plan: PARTA AND B / Product Type: MEDICARE   PATIENT ADDRESS:  [address] [place] [address] [address] [place] [place] [address]       yes

## 2023-04-07 ENCOUNTER — OUTPATIENT (OUTPATIENT)
Dept: OUTPATIENT SERVICES | Facility: HOSPITAL | Age: 23
LOS: 1 days | Discharge: HOME | End: 2023-04-07
Payer: MEDICAID

## 2023-04-10 DIAGNOSIS — F31.81 BIPOLAR II DISORDER: ICD-10-CM

## 2023-04-10 DIAGNOSIS — F60.3 BORDERLINE PERSONALITY DISORDER: ICD-10-CM

## 2023-04-10 DIAGNOSIS — F43.10 POST-TRAUMATIC STRESS DISORDER, UNSPECIFIED: ICD-10-CM

## 2023-04-14 PROCEDURE — 99214 OFFICE O/P EST MOD 30 MIN: CPT | Mod: 95

## 2023-06-13 NOTE — BH CONSULTATION LIAISON ASSESSMENT NOTE - NSSUICPROTFACT_PSY_ALL_CORE
"  Cardiology Clinic Progress Note    Service Date: 2023     Primary Cardiologist: Dr. Deleon      Reason for Visit: afib, HTN     HPI:   Ni Maya is a delightful 84-year-old woman with past cardiac history seen for the followin.  Chronic A-fib with rate control and anticoagulation approach  2.  Hypertension  3.  History of CVA with some residual memory issues, this was noted in   4.  Mild mitral regurg and normal EF.    I am meeting Sandy for the first time today.  She was last seen by Dr. Deleon about a month ago and was found to be tachycardic.  Her metoprolol had been discontinued in December when she presented with low but asymptomatic blood pressure.  Blood pressures are reasonably controlled at Dr. Deleon clinic visit and her amlodipine was discontinued and metoprolol was restarted at 50 mg twice daily.  When I last saw her we had worked on her hypertension, I added back amlodipine at 5 mg a day and kept her metoprolol at 50 mg twice daily.  We had her come in for nurse visit and at that time her blood pressure was excellent at 114/72.  This was compared to her home cuff that measured on the same arm 147/90 indicating that it was inaccurate.  She is here today for follow-up of those events.    She overall feels well.  She denies chest pain, shortness of breath, palpitations, syncope, presyncope or bleeding issues.  She is having frequent urination and was up to 6 times last night.  There is some burning with this but it does not feel like a UTI.  She notes that when she has had UTIs in the past she has had bleeding with solids.    Social History:  She comes in today with her  Marco Antonio.  Lifelong non-smoker no alcohol use.    Physical Exam:  /72 (BP Location: Right arm, Patient Position: Sitting, Cuff Size: Adult Regular)   Pulse 86   Ht 1.66 m (5' 5.35\")   Wt 78.9 kg (174 lb)   SpO2 99%   BMI 28.64 kg/m     Wt Readings from Last 5 Encounters:   23 78.9 kg (174 lb) "   05/16/23 79.7 kg (175 lb 12.8 oz)   05/16/23 79.7 kg (175 lb 12.8 oz)   04/12/23 79.8 kg (176 lb)   12/26/22 79.4 kg (175 lb)      Well-developed well-nourished woman in no acute distress.  Normocephalic atraumatic.  Heart is irregularly irregular.  I do not appreciate murmur rub or gallop.  Lungs are clear without wheezes rales or rhonchi.     Echocardiogram reviewed  Last creatinine in December 2022 was 0.9 sodium 142 BUN 17 potassium 3.8.    Assessment and Plan:  1.  Hypertension,, well controlled on current medications these were refilled today and she will continue on them.    2.  Chronic A-fib rates controlled today appropriately on Eliquis for a [id number] score of 7 (age, female gender, hypertension, type 2 diabetes, CVA).    3.  Mild mitral regurg with normal EF, will follow with serial echocardiograms can be repeated in 2 years.    4.  Urinary frequency, patient will make an appointment with primary care.    This patient is overall doing well.  We will see her back in about 9 months, sooner with concerns.      [facility] Cardiology       Orders this visit:  Orders Placed This Encounter   Procedures     Follow-Up with Cardiology SARA     Orders Placed This Encounter   Medications     atorvastatin (LIPITOR) 40 MG tablet     Sig: Take 1 tablet (40 mg) by mouth daily     Dispense:  90 tablet     Refill:  3     lisinopril (ZESTRIL) 40 MG tablet     Sig: Take 1 tablet (40 mg) by mouth daily     Dispense:  90 tablet     Refill:  3     apixaban ANTICOAGULANT (ELIQUIS) 5 MG tablet     Sig: Take 1 tablet (5 mg) by mouth 2 times daily     Dispense:  180 tablet     Refill:  3     Medications Discontinued During This Encounter   Medication Reason     ALLEGRA 180 MG PO TABS      atorvastatin (LIPITOR) 40 MG tablet Reorder (No AVS / No eCancel)     lisinopril (ZESTRIL) 40 MG tablet      apixaban ANTICOAGULANT (ELIQUIS) 5 MG tablet Reorder (No AVS / No eCancel)     Encounter Diagnoses   Name  Primary?     Chronic atrial fibrillation (H)      Benign essential hypertension      Hyperlipidemia with target LDL less than 100      Chronic kidney disease, stage II (mild)      Type 2 diabetes mellitus with hyperglycemia, without long-term current use of insulin (H)      Essential hypertension with goal blood pressure less than 140/90        Current Medications:  Current Outpatient Medications   Medication Sig Dispense Refill     alcohol swab prep pads Use to swab area of injection/geoffrey as directed. 100 each 3     amLODIPine (NORVASC) 5 MG tablet Take 1 tablet (5 mg) by mouth daily 90 tablet 3     apixaban ANTICOAGULANT (ELIQUIS) 5 MG tablet Take 1 tablet (5 mg) by mouth 2 times daily 180 tablet 3     atorvastatin (LIPITOR) 40 MG tablet Take 1 tablet (40 mg) by mouth daily 90 tablet 3     blood glucose (RELION PRIME TEST) test strip 1 strip by In Vitro route 2 times daily Use to test blood sugar 3 times daily or as directed. 100 strip 1     blood glucose calibration (NO BRAND SPECIFIED) solution To accompany: Blood Glucose Monitor Brands: per insurance. 1 Bottle 3     blood glucose monitoring (NO BRAND SPECIFIED) meter device kit Use to test blood sugar 3 times daily or as directed. Preferred blood glucose meter OR supplies to accompany: Blood Glucose Monitor Brands: per insurance. 1 kit 0     Blood Glucose Monitoring Suppl W/DEVICE KIT 1 kit 3 times daily. 1 kit 0     co-enzyme Q-10 100 MG CAPS capsule Take 100 mg by mouth daily       fish oil-omega-3 fatty acids 1000 MG capsule Take 2 g by mouth daily. 2 caps per day       Lancets (E-Z JECT LANCET MICRO-THIN 33G) MISC        levothyroxine (SYNTHROID/LEVOTHROID) 88 MCG tablet Take 1 tablet (88 mcg) by mouth daily 90 tablet 3     lisinopril (ZESTRIL) 40 MG tablet Take 1 tablet (40 mg) by mouth daily 90 tablet 3     metFORMIN (GLUCOPHAGE XR) 500 MG 24 hr tablet TAKE one TABLET ONCE DAILY WITH DINNER 90 tablet 1     metoprolol tartrate (LOPRESSOR) 50 MG tablet  Take 1 tablet (50 mg) by mouth 2 times daily 180 tablet 3     thin (NO BRAND SPECIFIED) lancets Use with lanceting device. To accompany: Blood Glucose Monitor Brands: per insurance. 100 each 6     UNABLE TO FIND MEDICATION NAME: resvante supplement         Allergies:  Allergies   Allergen Reactions     Sulfa Antibiotics Rash       Past Medical, Surgical and Family History:  Past Medical History:   Diagnosis Date     Allergic rhinitis, cause unspecified      Essential hypertension, benign      Infection of kidney, unspecified 1999     Other specified acquired hypothyroidism      Other specified types of cystitis(595.89)     1999,6-2-01, 10-10-01     Past Surgical History:   Procedure Laterality Date     BIOPSY VULVA/PERINEUM,ADDNL LESN  9/18/09    Wide -excision of MICKIE III- right labia      COLONOSCOPY  9/13/2013    Procedure: COLONOSCOPY;  Colonoscopy;  Surgeon: Yanick Ramsey MD;  Location: [facility]     HC BIOPSY OF BREAST, OPEN INCISIONAL  1960    Benign     HC ERCP W SPHINCTEROTOMY  1996 6/2/96 and 8/30/96     HC REDUCTION OF LARGE BREAST  1988     HC REMOVAL GALLBLADDER  1995     HC UGI ENDOSCOPY DIAG W OR W/O BRUSH/WASH  7-     INJECT EPIDURAL LUMBAR N/A 8/10/2017    Procedure: INJECT EPIDURAL LUMBAR;  Lumbar 2-3 Epidural Steroid Injection;  Surgeon: Kimani Garcia MD;  Location: [facility]     INJECT EPIDURAL LUMBAR Right 5/10/2018    Procedure: INJECT EPIDURAL LUMBAR;  right lumbar 2 - lumbar 3 epidural steroid injection;  Surgeon: Kimani Garcia MD;  Location: [facility]     ZZC LAPAROSCOPY, SURGICAL; W/ VAGINAL HYSTERECTOMY W/WO REMOVAL OVARY(S)/TUBES  1984    for bleeding     [facility] LIGATE FALLOPIAN TUBE,POSTPARTUM  1978    Tubal Ligation     [facility] ANGIOGRAPHY ARCH  4-3-98     [facility] COLONOSCOPY THRU STOMA, DIAGNOSTIC  4-24-98    Diverticuli - due in 2008     Family History   Problem Relation Age of Onset     Cardiovascular Father         Aortic aneurysm     Hypertension Father      Hypertension Mother       Gastrointestinal Disease Mother         GI Bleed     Lipids Sister      Hypertension Sister      Genitourinary Problems Sister      Allergies Sister      Cancer Brother         Lung     Alcohol/Drug Brother      Connective Tissue Disorder Son         Down Syndrome     Respiratory Son         Pneumonia     Cancer Maternal Grandmother         Stomach     Hypertension Maternal Grandfather      Allergies Daughter         Review of Systems:  Skin:  Negative     Eyes:  Positive for glasses  ENT:  Negative    Respiratory:  Negative    Cardiovascular:  Negative for;palpitations;chest pain;edema;lightheadedness;dizziness;fatigue    Gastroenterology: Negative    Genitourinary:  Positive for urinary frequency  Musculoskeletal:  Positive for arthritis;joint pain  Neurologic:  Negative    Psychiatric:  Positive for sleep disturbances  Heme/Lymph/Imm:  Positive for allergies  Endocrine:  Negative       Recent Lab Results:  LIPID RESULTS:  Lab Results   Component Value Date    CHOL 102 03/17/2022    CHOL 122 04/12/2021    HDL 49 (L) 03/17/2022    HDL 48 (L) 04/12/2021    LDL 28 03/17/2022    LDL 51 04/12/2021    TRIG 127 03/17/2022    TRIG 117 04/12/2021    CHOLHDLRATIO 3.6 10/19/2015     LIVER ENZYME RESULTS:  Lab Results   Component Value Date    AST 21 12/26/2022    AST 25 07/22/2020    ALT 27 12/26/2022    ALT 34 07/22/2020     CBC RESULTS:  Lab Results   Component Value Date    WBC 6.6 06/16/2022    WBC 6.7 07/22/2020    RBC 4.49 06/16/2022    RBC 4.66 07/22/2020    HGB 13.5 06/16/2022    HGB 13.9 07/22/2020    HCT 40.4 06/16/2022    HCT 42.8 07/22/2020    MCV 90 06/16/2022    MCV 92 07/22/2020    MCH 30.1 06/16/2022    MCH 29.8 07/22/2020    MCHC 33.4 06/16/2022    MCHC 32.5 07/22/2020    RDW 13.2 06/16/2022    RDW 13.2 07/22/2020     (L) 06/16/2022     (L) 07/22/2020     BMP RESULTS:  Lab Results   Component Value Date     12/26/2022     07/22/2020    POTASSIUM 3.8 12/26/2022    POTASSIUM 3.9  07/22/2020    CHLORIDE 104 12/26/2022    CHLORIDE 104 07/22/2020    CO2 33 (H) 12/26/2022    CO2 31 07/22/2020    ANIONGAP 5 12/26/2022    ANIONGAP 6 07/22/2020     (H) 12/26/2022     (H) 07/22/2020    BUN 17 12/26/2022    BUN 12 07/22/2020    CR 0.90 12/26/2022    CR 0.87 07/22/2020    GFRESTIMATED 63 12/26/2022    GFRESTIMATED 62 07/22/2020    GFRESTBLACK 72 07/22/2020    RODNEY 9.1 12/26/2022    RODNEY 9.1 07/22/2020      A1C RESULTS:  Lab Results   Component Value Date    A1C 7.2 (H) 12/26/2022    A1C 7.0 (H) 04/12/2021     INR RESULTS:  Lab Results   Component Value Date    INR 1.26 (H) 09/20/2019    INR 0.96 08/07/2017       CC  Sohan Deleon MD  [id number]  [place] [id number]     Supportive social network of family or friends

## 2023-07-18 NOTE — ED ADULT NURSE NOTE - CAS TRG GEN SKIN COLOR
[facility] Note     Patient Name: Ronnie Hinkle  Encounter Date: 07/18/2023     Have you been cared for by a [facility] in the last 3 years and, if so, which description applies to you? Yes. I have been here within the last 3 years, and my medical history has NOT changed since that time. I am MALE/not capable of bearing children. REVIEW OF SYSTEMS:  Have you recently had or currently have any of the following? · No changes in my recent health. PAST MEDICAL HISTORY:  Have you personally ever had or currently have any of the following? If "YES," then please provide more detail. · No changes in my medical history. FAMILY HISTORY:  Any "first degree relatives" (parent, brother, sister, or child) with the following? • No changes in my family's known health. PATIENT EXPERIENCE:    • Do you want the Dermatologist to perform a COMPLETE skin exam today including a clinical examination under the "bra and underwear" areas? NO  • If necessary, do we have your permission to call and leave a detailed message on your Preferred Phone number that includes your specific medical information?   Yes      No Known Allergies   Current Outpatient Medications:   •  aspirin (ECOTRIN LOW STRENGTH) 81 mg EC tablet, Take 81 mg by mouth daily, Disp: , Rfl:   •  atorvastatin (LIPITOR) 10 mg tablet, Take 1 tablet (10 mg total) by mouth daily, Disp: 90 tablet, Rfl: 3  •  lisinopril (ZESTRIL) 20 mg tablet, Take 1 tablet (20 mg total) by mouth daily, Disp: 90 tablet, Rfl: 3  •  multivitamin (THERAGRAN) TABS, Take 1 tablet by mouth, Disp: , Rfl:   •  Omega-3 Fatty Acids (FISH OIL PO), Take 1 g by mouth daily, Disp: , Rfl:   •  prednisoLONE acetate (PRED FORTE) 1 % ophthalmic suspension, INSTILL 1 DROP INTO BOTH EYES TWICE A DAY, Disp: , Rfl:   •  Prolensa 0.07 % SOLN, Administer 1 drop to both eyes in the morning, Disp: , Rfl:           • Whom besides the patient is providing clinical information about today's encounter? o Other:  wife     Physical Exam and Assessment/Plan by Diagnosis:  NEOPLASM OF UNCERTAIN BEHAVIOR OF SKIN    Physical Exam:  • (Anatomic Location); (Size and Morphological Description); (Differential Diagnosis):  o Specimen A; left superior chest; 2 cm warty papule; (diffdx) inflamed seborrheic keratosis rule out scc   o Specimen B: left axilla;  2 cm warty papule; (diffdx) inflamed seborrheic keratosis rule out scc   o   • Pertinent Positives:  • Pertinent Negatives: Additional History of Present Condition:  Growing and irritated     Assessment and Plan:  • I have discussed with the patient that a sample of skin via a "skin biopsy” would be potentially helpful to further make a specific diagnosis under the microscope. • Based on a thorough discussion of this condition and the management approach to it (including a comprehensive discussion of the known risks, side effects and potential benefits of treatment), the patient (family) agrees to implement the following specific plan:    o Procedure:  Skin Biopsy. After a thorough discussion of treatment options and risk/benefits/alternatives (including but not limited to local pain, scarring, dyspigmentation, blistering, possible superinfection, and inability to confirm a diagnosis via histopathology), verbal and written consent were obtained and portion of the rash was biopsied for tissue sample. See below for consent that was obtained from patient and subsequent Procedure Note.     PROCEDURE TANGENTIAL (SHAVE) BIOPSY NOTE:    • Performing Physician:   • Anatomic Location; Clinical Description with size (cm); Pre-Op Diagnosis:   o  Specimen A; left superior chest; 2 cm warty papule; (diffdx) inflamed seborrheic keratosis rule out scc   • Specimen B: left axilla;  2 cm warty papule; (diffdx) inflamed seborrheic keratosis rule out scc Post-op diagnosis: Same     • Local anesthesia: 1% Lidocaine HCL     • Topical anesthesia: None    • Hemostasis: Aluminum chloride       After obtaining informed consent  at which time there was a discussion about the purpose of biopsy  and low risks of infection and bleeding. The area was prepped and draped in the usual fashion. Anesthesia was obtained with 1% lidocaine with epinephrine. A shave biopsy to an appropriate sampling depth was obtained by Shave (Dermablade or 15 blade) The resulting wound was covered with surgical ointment and bandaged appropriately. The patient tolerated the procedure well without complications and was without signs of functional compromise. Specimen has been sent for review by Dermatopathology. Standard post-procedure care has been explained and has been included in written form within the patient's copy of Informed Consent. INFORMED CONSENT DISCUSSION AND POST-OPERATIVE INSTRUCTIONS FOR PATIENT    I.  RATIONALE FOR PROCEDURE  I understand that a skin biopsy allows the Dermatologist to test a lesion or rash under the microscope to obtain a diagnosis. It usually involves numbing the area with numbing medication and removing a small piece of skin; sometimes the area will be closed with sutures. In this specific procedure, sutures are not usually needed. If any sutures are placed, then they are usually need to be removed in 2 weeks or less. I understand that my Dermatologist recommends that a skin "shave" biopsy be performed today. A local anesthetic, similar to the kind that a dentist uses when filling a cavity, will be injected with a very small needle into the skin area to be sampled. The injected skin and tissue underneath "will go to sleep” and become numb so no pain should be felt afterwards. An instrument shaped like a tiny "razor blade" (shave biopsy instrument) will be used to cut a small piece of tissue and skin from the area so that a sample of tissue can be taken and examined more closely under the microscope.   A slight amount of bleeding will occur, but it will be stopped with direct pressure and a pressure bandage and any other appropriate methods. I understands that a scar will form where the wound was created. Surgical ointment will be applied to help protect the wound. Sutures are not usually needed. II.  RISKS AND POTENTIAL COMPLICATIONS   I understand the risks and potential complications of a skin biopsy include but are not limited to the following:  • Bleeding  • Infection  • Pain  • Scar/keloid  • Skin discoloration  • Incomplete Removal  • Recurrence  • Nerve Damage/Numbness/Loss of Function  • Allergic Reaction to Anesthesia  • Biopsies are diagnostic procedures and based on findings additional treatment or evaluation may be required  • Loss or destruction of specimen resulting in no additional findings    My Dermatologist has explained to me the nature of the condition, the nature of the procedure, and the benefits to be reasonably expected compared with alternative approaches. My Dermatologist has discussed the likelihood of major risks or complications of this procedure including the specific risks listed above, such as bleeding, infection, and scarring/keloid. I understand that a scar is expected after this procedure. I understand that my physician cannot predict if the scar will form a "keloid," which extends beyond the borders of the wound that is created. A keloid is a thick, painful, and bumpy scar. A keloid can be difficult to treat, as it does not always respond well to therapy, which includes injecting cortisone directly into the keloid every few weeks. While this usually reduces the pain and size of the scar, it does not eliminate it. I understand that photographs may be taken before and after the procedure. These will be maintained as part of the medical providers confidential records and may not be made available to me.   I further authorize the medical provider to use the photographs for teaching purposes or to illustrate scientific papers, books, or lectures if in his/her judgment, medical research, education, or science may benefit from its use. I have had an opportunity to fully inquire about the risks and benefits of this procedure and its alternatives. I have been given ample time and opportunity to ask questions and to seek a second opinion if I wished to do so. I acknowledge that there have specifically been no guarantees as to the cosmetic results from the procedure. I am aware that with any procedure there is always the possibility of an unexpected complication. III. POST-PROCEDURAL CARE (WHAT YOU WILL NEED TO DO "AFTER THE BIOPSY" TO OPTIMIZE HEALING)    • Keep the area clean and dry. Try NOT to remove the bandage or get it wet for the first 24 hours. • Gently clean the area and apply surgical ointment (such as Vaseline petrolatum ointment, which is available "over the counter" and not a prescription) to the biopsy site for up to 2 weeks straight. This acts to protect the wound from the outside world. • Sutures are not usually placed in this procedure. If any sutures were placed, return for suture removal as instructed (generally 1 week for the face, 2 weeks for the body). • Take Acetaminophen (Tylenol) for discomfort, if no contraindications. Ibuprofen or aspirin could make bleeding worse. • Call our office immediately for signs of infection: fever, chills, increased redness, warmth, tenderness, discomfort/pain, or pus or foul smell coming from the wound. WHAT TO DO IF THERE IS ANY BLEEDING? If a small amount of bleeding is noticed, place a clean cloth over the area and apply firm pressure for ten minutes. Check the wound after 10 minutes of direct pressure. If bleeding persists, try one more time for an additional 10 minutes of direct pressure on the area.   If the bleeding becomes heavier or does not stop after the second attempt, or if you have any other questions about this procedure, then please call your [facility]'s Dermatologist by calling [phone] (SKIN). I hereby acknowledge that I have reviewed and verified the site with my Dermatologist and have requested and authorized my Dermatologist to proceed with the procedure.             Scribe Attestation    I,:  Cleven Lombard am acting as a scribe while in the presence of the attending physician.:       I,:  Christine Teran MD personally performed the services described in this documentation    as scribed in my presence.: Pink/Normal for race

## 2023-10-21 NOTE — PATIENT PROFILE ADULT - TRANSPORTATION
OFFICE VISIT    Patient: Jesica Mccord   : 1963 MRN: [id number]    SUBJECTIVE:  Chief Complaint   Patient presents with   • Physical       Patient has given consent to record this visit for documentation in their clinical record.    A 59 year old female presents for      HISTORY OF PRESENT ILLNESS:       PAST MEDICAL HISTORY:  Past Medical History:   Diagnosis Date   • Bilateral knee pain    • BMI 45.0-49.9, adult (CMD)    • Elevated fasting blood sugar    • Hypertension 2014    -- Hyzaar 100/25 one by mouth daily, Toprol-XL 25 mg daily, Elisha Mcnally MD, 2014.      MEDICATIONS:  Current Outpatient Medications   Medication Sig Dispense Refill   • meloxicam (MOBIC) 15 MG tablet Take 1 tablet by mouth daily as needed for Pain. 30 tablet 5   • hydroCHLOROthiazide (HYDRODIURIL) 25 MG tablet Take 1 tablet by mouth daily. 90 tablet 3   • losartan (COZAAR) 100 MG tablet Take 1 tablet by mouth daily. 90 tablet 3   • metFORMIN (GLUCOPHAGE-XR) 500 MG 24 hr tablet Take 1 tablet by mouth daily (with breakfast). 90 tablet 3   • metoPROLOL succinate (Toprol XL) 25 MG 24 hr tablet Take 1 tablet by mouth daily. 90 tablet 3   • cholecalciferol (VITAMIN D) 25 mcg (1,000 units) tablet Take by mouth daily.     • esomeprazole (NEXIUM) 20 MG capsule Take 20 mg by mouth every other day.        No current facility-administered medications for this visit.     ALLERGIES:  Allergies as of 10/20/2023 - Reviewed 10/20/2023   Allergen Reaction Noted   • Erythromycin  2014   • Ace inhibitors  2014     FAMILY HISTORY:  Family History   Problem Relation Age of Onset   • Glaucoma Mother    • Cancer Mother    • Cataracts Mother    • Gastrointestinal Mother    • Cancer, Prostate Father    • Cancer Father    • Diabetes Maternal Grandmother    • Cancer, Colon Maternal Cousin 46   • Patient is unaware of any medical problems Child    • Sleep Apnea Neg Hx    • Thyroid Neg Hx      SOCIAL HISTORY:  Social History      Tobacco Use   • Smoking status: Never   • Smokeless tobacco: Never   Vaping Use   • Vaping Use: never used   Substance Use Topics   • Alcohol use: Yes     Alcohol/week: 8.0 standard drinks of alcohol     Types: 8 Standard drinks or equivalent per week     Comment: 5 days/week; 1-2 drinks/day   • Drug use: Never     Past Surgical HISTORY  Past Surgical History:   Procedure Laterality Date   • Appendectomy  85   •  section, classic  85, 87   •  section, low transverse     • Colonoscopy diagnostic  2019    Colonoscopy, Dx;due for repeat 5 years 2024   • Knee arthroscopy w/ synovial biopsy      left knee scope   • Laparoscopic cholecystectomy     • Tonsillectomy     • Total knee arthroplasty Bilateral 2020    Dr. Ronquillo       REVIEW OF SYSTEMS:      OBJECTIVE:  Visit Vitals  /82 (BP Location: LUE - Left upper extremity, Patient Position: Sitting, Cuff Size: Regular)   Pulse 78   Ht 5' 4\"   Wt 122.2 kg (269 lb 6.4 oz)   LMP  (LMP Unknown) Comment: age 50   SpO2 97%   BMI 46.24 kg/m²       PHYSICAL EXAM:    DIAGNOSTIC STUDIES:  LAB RESULTS:  Lab Services on 10/09/2023   Component Date Value Ref Range Status   • Cholesterol 10/09/2023 207 (H)  <=199 mg/dL Final   • Triglycerides 10/09/2023 88  <=149 mg/dL Final   • HDL 10/09/2023 59  >=50 mg/dL Final   • LDL 10/09/2023 130 (H)  <=129 mg/dL Final   • Non-HDL Cholesterol 10/09/2023 148  mg/dL Final   • Cholesterol/ HDL Ratio 10/09/2023 3.5  <=4.4 Final   • Fasting Status 10/09/2023 13  0 - 999 Hours Final   • Sodium 10/09/2023 142  135 - 145 mmol/L Final   • Potassium 10/09/2023 4.4  3.4 - 5.1 mmol/L Final   • Chloride 10/09/2023 104  97 - 110 mmol/L Final   • Carbon Dioxide 10/09/2023 29  21 - 32 mmol/L Final   • Anion Gap 10/09/2023 13  7 - 19 mmol/L Final   • Glucose 10/09/2023 120 (H)  70 - 99 mg/dL Final   • BUN 10/09/2023 15  6 - 20 mg/dL Final   • Creatinine 10/09/2023 0.75  0.51 - 0.95 mg/dL Final   •  Glomerular Filtration Rate 10/09/2023 >90  >=60 Final   • BUN/Cr 10/09/2023 20  7 - 25 Final   • Calcium 10/09/2023 9.2  8.4 - 10.2 mg/dL Final   • Bilirubin, Total 10/09/2023 0.8  0.2 - 1.0 mg/dL Final   • GOT/AST 10/09/2023 38 (H)  <=37 Units/L Final   • GPT/ALT 10/09/2023 73 (H)  <64 Units/L Final   • Alkaline Phosphatase 10/09/2023 85  45 - 117 Units/L Final   • Albumin 10/09/2023 3.9  3.6 - 5.1 g/dL Final   • Protein, Total 10/09/2023 7.0  6.4 - 8.2 g/dL Final   • Globulin 10/09/2023 3.1  2.0 - 4.0 g/dL Final   • A/G Ratio 10/09/2023 1.3  1.0 - 2.4 Final   • Hemoglobin A1C 10/09/2023 5.5  4.5 - 5.6 % Final   • WBC 10/09/2023 4.6  4.2 - 11.0 K/mcL Final   • RBC 10/09/2023 4.78  4.00 - 5.20 mil/mcL Final   • HGB 10/09/2023 14.2  12.0 - 15.5 g/dL Final   • HCT 10/09/2023 42.3  36.0 - 46.5 % Final   • MCV 10/09/2023 88.5  78.0 - 100.0 fl Final   • MCH 10/09/2023 29.7  26.0 - 34.0 pg Final   • MCHC 10/09/2023 33.6  32.0 - 36.5 g/dL Final   • RDW-CV 10/09/2023 11.9  11.0 - 15.0 % Final   • RDW-SD 10/09/2023 38.7 (L)  39.0 - 50.0 fL Final   • PLT 10/09/2023 219  140 - 450 K/mcL Final   • Neutrophil, Percent 10/09/2023 49  % Final   • Lymphocytes, Percent 10/09/2023 36  % Final   • Mono, Percent 10/09/2023 11  % Final   • Eosinophils, Percent 10/09/2023 3  % Final   • Basophils, Percent 10/09/2023 1  % Final   • Immature Granulocytes 10/09/2023 0  % Final   • Absolute Neutrophils 10/09/2023 2.3  1.8 - 7.7 K/mcL Final   • Absolute Lymphocytes 10/09/2023 1.7  1.0 - 4.0 K/mcL Final   • Absolute Monocytes 10/09/2023 0.5  0.3 - 0.9 K/mcL Final   • Absolute Eosinophils  10/09/2023 0.1  0.0 - 0.5 K/mcL Final   • Absolute Basophils 10/09/2023 0.0  0.0 - 0.3 K/mcL Final   • Absolute Immature Granulocytes 10/09/2023 0.0  0.0 - 0.2 K/mcL Final       ASSESSMENT AND PLAN:  This 59 year old female presents with :  1. Routine physical examination    2. Primary hypertension    3. Obesity, morbid, BMI 40.0-49.9 (CMD)    4. Elevated  fasting glucose        No orders of the defined types were placed in this encounter.      PLAN:    Refer to orders.  Medical compliance with plan discussed and risks of non-compliance reviewed.  Patient education completed on disease process, etiology & prognosis.  Proper usage and side effects of medications reviewed & discussed.  Return to clinic as clinically indicated as discussed with the patient who verbalized understanding of the plan and is in agreement with the plan.    [employer], have created a visit summary document based on the audio recording between Ms. Marni Crane NP and this patient for the physician to review, edit as needed, and authenticate.    Creation Date: 10/21/2023      yes

## 2023-12-20 NOTE — BH CONSULTATION LIAISON ASSESSMENT NOTE - LEVEL OF CONSCIOUSNESS
[facility] Hospitalist H&P     Patient Info: Date: 23    Name: Shawna Mc Status:  Inpatient   : 1959 MRN: [id number]   Hosp Day: 5 PCP: Tamra Madrigal MD       Code Status: Full Resuscitation    No results found for: \"KYOIUAC3RWT\", \"UYXKSJIV2LVO\"      REASON FOR ADMISSION:  New CVA was admitted on 12/15/2023 to [facility]     HPI:  This is a 64 year old female with PMH of HTN, hypothyroidism, morbid obesity was admitted with a left MCA occlusion now status post TNK and thrombectomy.  Patient has been evaluated by neurology team.  CT scan showed large left MCA stroke with midline shift.  Patient is slightly improving currently on NG feeding aphasic unable to move right upper and lower EXTR she was having difficulty breathing for last few days but now improving she was on high flow oxygen but has improved.  Patient also has been seen by PMR service and they are waiting for her improvement in respiratory status before transferring to  S.  Patient receiving aspirin statins and start her on Plavix.  Stroke neurologist I will continue to follow her.  I spoke to the family by side her  and the son and they wanted to know when his he will be moving out of neuro ICU and then to rehab facility          ROS:  Negative for all 14 systems except as mentioned per HPI     PMH:  Past Medical History:   Diagnosis Date    Diabetes mellitus (CMD)     Essential (primary) hypertension     High cholesterol         PSH:  History reviewed. No pertinent surgical history.     SOCIAL HX:  Shawna denies any illicit drug use except for as noted below.  Social History     Tobacco Use    Smoking status: Never     Passive exposure: Never    Smokeless tobacco: Never   Vaping Use    Vaping Use: never used   Substance Use Topics    Alcohol use: Never    Drug use: Never        FAMILY-HX:  History reviewed. No pertinent family history.  Patient is not aware of any other chronic conditions in mother father or direct siblings      ALLERGY:  ALLERGIES:  Patient has no known allergies.  Patient is not aware of any other allergies.     HOME MEDS:  Medications Prior to Admission   Medication Sig Dispense Refill    atorvastatin (LIPITOR) 20 MG tablet Take 20 mg by mouth daily. (Patient not taking: Reported on 12/16/2023)      amLODIPine (NORVASC) 10 MG tablet Take 10 mg by mouth daily.      aspirin 325 MG tablet Take 325 mg by mouth daily.      levothyroxine 50 MCG tablet Take 50 mcg by mouth daily.      metFORMIN (GLUCOPHAGE) 500 MG tablet Take 500 mg by mouth in the morning and 500 mg in the evening. Take with meals.          VITALS:  Vital Last Value 24 Hour Range   Temperature 98 °F (36.7 °C) (12/20/23 0800) Temp  Min: 97.7 °F (36.5 °C)  Max: 98.9 °F (37.2 °C)   Pulse 97 (12/20/23 1000) Pulse  Min: 64  Max: 113   Respiratory 15 (12/20/23 1000) Resp  Min: 11  Max: 19   Blood Pressure (!) 112/94 (12/20/23 1000) BP  Min: 112/94  Max: 168/65   Pulse Oximetry 99 % (12/20/23 1000) SpO2  Min: 94 %  Max: 100 %      Today Admitted   Weight 76.2 kg (167 lb 15.9 oz) (12/20/23 0000) Weight: 74.4 kg (164 lb 0.4 oz) (12/15/23 0148)   Height N/A Height: 5' 4\" (162.6 cm) (12/15/23 0253)   BMI N/A BMI (Calculated): 28.15 (12/15/23 0253)          PHYSICAL EXAM:  General:AxO3, No acute distress morbidly obese.    Eye:  Pupils are equal, round and reactive to light,  Normal conjunctiva,     HENT:  Normocephalic.    Neck:  Supple, Non-tender.    Respiratory:  Lungs are clear to auscultation.   Chest Wall: No tenderness  Cardiovascular:  Normal rate, Regular rhythm, No murmur, No edema    Gastrointestinal:  Soft, Non-tender, Normal bowel sounds.    Genitourinary:  Neg    Integumentary:  Warm.    Neurologic: Awake, aphasic, left hemiparesis  Musculoskeletal: bilateral upper and lower ext->WNL  Psychiatric:  Cooperative       I&O    Intake/Output Summary (Last 24 hours) at 12/20/2023 1125  Last data filed at 12/20/2023 1000  Gross per 24 hour   Intake 1776.31 ml    Output 1900 ml   Net -123.69 ml         Recent Labs   Lab 12/20/23 0315 12/19/23  0356 12/18/23  0513 12/17/23  0528 12/15/23  0352 12/14/23  2204   WBC 19.0* 21.3* 21.8* 19.9*   < > 14.3*   RBC 4.33 4.14 4.20 4.15   < > 4.55   HGB 12.1 11.6* 11.8* 11.5*   < > 12.7   HCT 39.8 37.1 37.0 36.9   < > 39.4   PLT  --  298 313 271   < > 360   MCV 91.9 89.6 88.1 88.9   < > 86.6   MCH 27.9 28.0 28.1 27.7   < > 27.9   MCHC 30.4* 31.3* 31.9* 31.2*   < > 32.2   NRBC 0 0 0 0   < > 0   Absolute Neutrophil  --   --   --   --   --  7.3   Absolute Lymphocytes  --   --   --   --   --  5.4*   Absolute Monocytes  --   --   --   --   --  0.7   Absolute Eosinophils  --   --   --   --   --  0.9*   Platelet Morphology  --   --   --   --   --  Normal    < > = values in this interval not displayed.          No results found No results found for: \"IRON\"    Recent Labs   Lab 12/14/23  2204   INR 0.9   Protime- PT 10.3   PTT 25          No results for input(s): \"NUERVTX2ENX\", \"QSIVOFNA5CWA\" in the last 8765 hours.       Recent Labs   Lab 12/20/23 0315 12/19/23  0356 12/18/23  1552 12/18/23  0513 12/16/23  0521 12/15/23  1608   Sodium 148* 149* 150* 145   < >  --    Potassium 4.4 3.8  --  4.1   < > 4.2   Chloride 119* 116*  --  116*   < >  --    Carbon Dioxide 26 27  --  22   < >  --    Anion Gap 7 10  --  11   < >  --    Glucose 151* 191*  --  216*   < >  --    BUN 46* 39*  --  30*   < >  --    Creatinine 0.93 0.86  --  0.76   < >  --    Glomerular Filtration Rate 69 75  --  87   < >  --    Calcium 9.1 9.0  --  8.9   < >  --    Magnesium 2.9* 2.7*  --  2.7*   < >  --    Phosphorus 5.2* 5.0*  --  4.8*   < >  --    Bilirubin, Total 0.1* 0.3  --  0.5   < >  --    GOT/AST 26 27  --  29   < >  --    GPT 31 32  --  26   < >  --    Alkaline Phosphatase 101 93  --  93   < >  --    Albumin 2.7* 2.8*  --  3.0*   < >  --    Globulin 4.1* 3.9  --  4.1*   < >  --    A/G Ratio 0.7* 0.7*  --  0.7*   < >  --    CK  --  171  --   --   --  148    < > =  values in this interval not displayed.        Recent Labs   Lab 12/14/23  2204   Troponin I, High Sensitivity 16        ABG  Recent Labs   Lab 12/19/23  0104 12/15/23  0217   RAPH 7.51* 7.41   RAPCO2 37 38   RAPO2 142* 189*   RAHCO3 30* 24   RASAT 99 100*     No results for input(s): \"APH\", \"APCO2\", \"APO2\", \"ARTBEDP\", \"ASAT\", \"5HGB\", \"5K\", \"5CAI\", \"5ALA\" in the last 8765 hours.    Invalid input(s): \"AHCO\"       Recent Labs   Lab 12/16/23  0521 12/15/23  0352 12/14/23  2343   Hemoglobin A1C  --  6.9* 7.0*   Cholesterol 245*  --  268*   CALCLDL 166*  --  167*   HDL 52  --  48*   Triglycerides 135  --  265*   TSH  --  6.887*  --         Accuchecks  Recent Labs   Lab 12/19/23  1718 12/19/23  2326 12/20/23  0530   GLUCOSE BEDSIDE 165* 157* 170*          Urine Panel  Lab Results   Component Value Date    UKET Negative 12/18/2023    USPG 1.020 12/18/2023    UPROT 100 (A) 12/18/2023    UWBC Large (A) 12/18/2023    URBC Small (A) 12/18/2023    UBILI Negative 12/18/2023    UPH 5.5 12/18/2023           Cultures:  Microbiology Results       None             RADIOLOGY  XR CHEST AP OR PA   Final Result   Impression:    No acute cardiopulmonary abnormality.      Electronically Signed by: RUPESH MCGARRY MD    Signed on: 12/19/2023 6:43 AM    Workstation ID: [id number]      XR ABDOMEN 1 VIEW   Final Result   Impression:   NG tube demonstrated in the left upper quadrant of the abdomen the region   of the stomach. Subtle density in the left lower lobe is new since previous   examination correlate with clinical exam for infiltrate. Minimal   atelectasis right lung base. Correlate for possible aspiration.   Degenerative changes spinal axis. Bowel gas pattern is otherwise normal.         Remainder of the exam is otherwise stable.                         Electronically Signed by: RYNE LOU MD    Signed on: 12/18/2023 11:57 PM    Workstation ID: [id number]      XR CHEST AP OR PA   Final Result      CT HEAD WO CONTRAST   Final  Alert Result   There is a moderate to large area of low density noted extending to the   cortex involving the left posterior frontal lobe, left parietal lobe and   the posterior left temporal lobe. This is compatible with left MCA   territory subacute infarct. Mild mass effect on the left lateral ventricle.   2 mm midline shift to the right.      No evidence of acute intracranial hemorrhage or hydrocephalus.      Electronically Signed by: EDWINA VILCHIS MD    Signed on: 12/18/2023 9:07 AM    Workstation ID: [id number]      XR CHEST AP OR PA   Final Result   FINDINGS/IMPRESSION:   1 view(s).      Feeding tube is below the diaphragm and the distal tip is not included in   the field-of-view. Linear opacities in the lung bases likely atelectasis.   There is no infiltrate or effusion or pneumothorax. Cardiomediastinal   silhouette is stable. Degenerative changes.                  Electronically Signed by: SHAHEEN SALDAÑA M.D.    Signed on: 12/17/2023 11:43 AM    Workstation ID: [id number]      CT HEAD WO CONTRAST   Final Result   There is a moderate to large area of low density noted in the left   parietal, temporal posterior frontal lobes and the left basal ganglia   region compatible with the developing left MCA stroke. There is mild mass   effect on the left lateral ventricle with approximately 2.0 mm midline   shift to the right. No evidence of acute intracranial hemorrhage is seen.         Electronically Signed by: EDWINA VILCHIS MD    Signed on: 12/17/2023 10:00 AM    Workstation ID: [id number]      CT HEAD WO CONTRAST   Final Result      No evidence of acute intracranial hemorrhage, hydrocephalus, or midline   shift.  There are low-density changes extending to the cortex in the left   parietal, left temporal and posterior left insula region, compatible with   evolving left MCA stroke.      Electronically Signed by: EDWINA VILCHIS MD    Signed on: 12/16/2023 8:10 AM    Workstation ID: [id number]      XR  NASOGASTRIC TUBE CHECK ABDOMEN   Final Result   FINDINGS/IMPRESSION:        Limited view of the abdomen was performed to evaluate for Enteric tube   placement. Enteric tube terminates in the proximal stomach. Nonobstructive   bowel gas pattern in the upper abdomen.      Electronically Signed by: MARIA L PACHECO MD    Signed on: 12/15/2023 11:41 AM    Workstation ID: [id number]      XR ABDOMEN 1 VIEW   Final Result      Enteric tube terminates in the gastric cardia/gastroesophageal junction.   Recommend advancing by approximately 6 cm.               Electronically Signed by: MILA PAUL M.D.    Signed on: 12/15/2023 11:38 AM    Workstation ID: [id number]      XR CHEST AP OR PA   Final Result   Impression:       Low lung volumes. No radiographic evidence of acute cardiopulmonary   disease. Further evaluation with a CT chest can be performed if clinically   warranted.      Electronically Signed by: MARIA L PACHECO MD    Signed on: 12/15/2023 7:47 AM    Workstation ID: [id number]      CT HEAD DUAL ENERGY WO CONTRAST   Final Result      Evolving left MCA territory infarct.  No hemorrhage or herniation.            Electronically signed by Mabel Tenorio MD on 12 15 23 at 04:27      IR INTRACRANIAL ARTERY MECHANICAL THROMBECTOMY AND/OR THROMBOLYSIS   Final Result   Impression:      There was complete occlusion of a left M2 branch segment of the MCA   (initial TICI 0). We performed combined aspiration and stent retriever   deployment with TICI 2C recanalization of the vessel after 2 passes.               Electronically Signed by: AN LOMAX    Signed on: 12/15/2023 10:44 AM    Workstation ID: [id number]      FL VIDEO SWALLOW    (Results Pending)        CT:  === 12/15/23 ===    CT HEAD WO CONTRAST    - Impression -  There is a moderate to large area of low density noted extending to the  cortex involving the left posterior frontal lobe, left parietal lobe and  the posterior left temporal lobe. This is  compatible with left MCA  territory subacute infarct. Mild mass effect on the left lateral ventricle.  2 mm midline shift to the right.    No evidence of acute intracranial hemorrhage or hydrocephalus.    Electronically Signed by: EDWINA VILCHIS MD  Signed on: 2023 9:07 AM  Workstation ID: [id number]    ___________________________________________________________________________    CT HEAD WO CONTRAST    - Impression -  There is a moderate to large area of low density noted in the left  parietal, temporal posterior frontal lobes and the left basal ganglia  region compatible with the developing left MCA stroke. There is mild mass  effect on the left lateral ventricle with approximately 2.0 mm midline  shift to the right. No evidence of acute intracranial hemorrhage is seen.      Electronically Signed by: EDWINA VILCHIS MD  Signed on: 2023 10:00 AM  Workstation ID: [id number]    ___________________________________________________________________________    CT HEAD DUAL ENERGY WO CONTRAST    - Impression -  Evolving left MCA territory infarct.  No hemorrhage or herniation.        Electronically signed by Mabel Tenorio MD on 12 15 23 at 04:27     MRI:  No results found for this or any previous visit.     Nuclear Medicine:  No results found for this or any previous visit.  No results found for this or any previous visit.     Mammo:  No results found for this or any previous visit.     Echocardiogram:  Results for orders placed during the hospital encounter of 12/15/23    TRANSTHORACIC ECHO (TTE) COMPLETE W/ W/O IMAGING AGENT    Narrative  *[facility]*  [address] [address] [phone]  Transthoracic Echocardiogram (TTE)    Patient: Shawna Mc   Study Date/Time:          Dec 15 2023 7:41AM  MRN:     [id number]       FIN#:                     [id number]  :     1959     Ht/Wt:                    162cm 74.4kg  Age:     64             BSA/BMI:                   1.85m^2 28.3kg/m^2  Gender:  F              Baseline BP:              132 / 63  Ordering Physician:    Jennifer Orozco    Referring Physician:   Reji Rojas    Attending Physician:   Hubert Walters    Diagnostic Physician:  Jose Ramirez MD  Sonographer:           BHAVESH Noriega    ------------------------------------------------------------------------------  INDICATIONS:   Cerebrovascular accident.    ------------------------------------------------------------------------------  STUDY CONCLUSIONS  SUMMARY:    1. Left ventricle: The cavity size is normal. Wall thickness is moderately  increased. Systolic function is hyperdynamic. The ejection fraction was  measured by visual estimation. The tissue Doppler parameters are abnormal.  Left ventricular diastolic function parameters are normal. The ejection  fraction is 70%.  2. Aortic valve: The annulus is normal-sized and mildly to moderately  calcified. The valve is trileaflet. The leaflets are mildly sclerotic.  Velocity is increased. There is mild to moderate stenosis.  3. Mitral valve: The annulus is normal-sized. The annulus is mildly to  moderately calcified. The leaflets are mildly calcified. There is mild  regurgitation.  4. Right ventricle: The cavity size is normal. Systolic function is normal.  5. Atrial septum: Agitated saline contrast study shows no shunt.  6. Tricuspid valve: There is trace regurgitation.    ------------------------------------------------------------------------------  STUDY DATA:  Patient room number: [facility]-4.  Procedure:  A transthoracic  echocardiogram was performed. Image quality was good. Intravenous contrast  (agitated saline) was administered to identify shunting.  M-mode, complete 2D,  complete spectral Doppler, and color Doppler.  Study status:  Routine.  Study  completion:  There were no complications.    FINDINGS    BASELINE ECG:   Tachycardia.  LEFT VENTRICLE:  Well visualized. The cavity size is  normal. Wall thickness is  moderately increased. Systolic function is hyperdynamic. Wall motion is  normal; there are no regional wall motion abnormalities.    The ejection  fraction was measured by visual estimation. The ejection fraction is 70%. The  tissue Doppler parameters are abnormal. Left ventricular diastolic function  parameters are normal.    AORTIC VALVE:  The annulus is normal-sized and mildly to moderately calcified.  The valve is trileaflet. The leaflets are mildly sclerotic. Velocity is  increased. There is mild to moderate stenosis. There is no regurgitation. The  mean systolic gradient is 20mm Hg. The peak systolic gradient is 31mm Hg. The  LVOT to aortic valve VTI ratio is 0.7. The valve area is 1.6cm^2. The valve  area index is 0.86cm^2/m^2. The ratio of LVOT to aortic valve peak velocity is  0.54. The valve area is 1.5cm^2. The valve area index is 0.83cm^2/m^2.    AORTA:  Aortic root: The root is normal-sized.  Ascending aorta: The vessel is normal-sized.    MITRAL VALVE:  The annulus is normal-sized. The annulus is mildly to  moderately calcified. The leaflets are mildly calcified. Inflow velocity is  within the normal range. There is no evidence for stenosis. There is mild  regurgitation. The peak diastolic gradient is 5mm Hg. The valve area by  pressure half-time is 3.2cm^2. The valve area index by pressure half-time is  1.75cm^2/m^2.    ATRIAL SEPTUM:  Agitated saline contrast study shows no shunt.    LEFT ATRIUM:  The atrium is normal in size.    RIGHT VENTRICLE:  The cavity size is normal. Systolic function is normal.    PULMONIC VALVE:   The annulus is normal-sized. There is no regurgitation. The  mean systolic gradient is 9mm Hg. The peak systolic gradient is 18mm Hg.    TRICUSPID VALVE:  The annulus is normal-sized. There is trace regurgitation.    RIGHT ATRIUM:  The atrium is normal in size.       The estimated central  venous pressure is 3mm Hg.    PERICARDIUM:   There is no  pericardial effusion.    SYSTEMIC VEINS:  Inferior vena cava: The IVC is normal-sized.  Respirophasic diameter changes  are in the normal range (>= 50%).    ------------------------------------------------------------------------------  Measurements    Left ventricle            Value        Ref        Left atrium continued     Value          Ref  JUAN C, LAX chord        (N) 4.0   cm     3.8 - 5.2  Area ES, A4C          (N) 18    cm^2     <=20  ESD, LAX chord        (N) 2.6   cm     2.2 - 3.5  Area/bsa ES, A4C          9.56  cm^2/m^2 ---------  JUAN C/bsa, LAX chord    (L) 2.2   cm/m^2 2.3 - 3.1  Area ES, A2C              15    cm^2     ---------  ESD/bsa, LAX chord    (N) 1.4   cm/m^2 1.3 - 2.1  Area/bsa ES, A2C          8.16  cm^2/m^2 ---------  PW, ED, LAX           (H) 1.3   cm     0.6 - 0.9  Vol, S                (N) 49    ml       22 - 52  JUAN C major ax, A4C         6.9   cm     ---------  Vol/bsa, S            (N) 26    ml/m^2   16 - 34  ESD major ax, A4C         5.7   cm     ---------  Vol, ES, 1-p A4C      (N) 46    ml       22 - 52  FS major axis, A4C        18    %      ---------  Vol/bsa, ES, 1-p A4C  (N) 25    ml/m^2   11 - 40  JUAN C/bsa major ax, A4C     3.7   cm/m^2 ---------  Vol, ES, 1-p A2C      (N) 42    ml       22 - 52  ESD/bsa major ax, A4C     3.1   cm/m^2 ---------  Vol/bsa, ES, 1-p A2C  (N) 22    ml/m^2   13 - 40  IKE, A4C                  27.7  cm^2   ---------  Vol, ES, 2-p              49    ml       ---------  JINA, A4C                  14.3  cm^2   ---------  Vol/bsa, ES, 2-p      (N) 26    ml/m^2   16 - 34  FAC, A4C                  48    %      ---------  IVS, ED               (H) 1.3   cm     0.6 - 0.9  Aortic valve              Value          Ref  PW, ED                (H) 1.3   cm     0.6 - 0.9  Peak v, S                 2.8   m/sec    ---------  IVS/PW, ED                0.99         ---------  Mean v, S                 2.02  m/sec    ---------  EDV                   (N) 64    ml     46 -  106   Mean grad, S              20    mm Hg    ---------  ESV                   (N) 18    ml     14 - 42    Peak grad, S              31    mm Hg    ---------  EF                    (N) 70    %      54 - 74    LVOT/AV, VTI ratio        0.7            ---------  SV                        45    ml     ---------  TONY, VTI                  1.6   cm^2     ---------  EDV/bsa               (N) 35    ml/m^2 29 - 61    TONY/bsa, VTI              0.86  cm^2/m^2 ---------  ESV/bsa               (N) 10    ml/m^2 8 - 24     LVOT/AV, Vpeak ratio      0.54           ---------  SV/bsa                    24    ml/m^2 ---------  TONY, Vmax                 1.5   cm^2     ---------  SV, 1-p A4C               59    ml     ---------  TONY/bsa, Vmax             0.83  cm^2/m^2 ---------  SV/bsa, 1-p A4C           32    ml/m^2 ---------  ESV, 2-p              (N) 32    ml     14 - 42    Mitral valve              Value          Ref  ESV/bsa, 2-p          (N) 17    ml/m^2 8 - 24     Peak E                    1.11  m/sec    ---------  E', lat john, TDI      (L) 8.4   cm/sec >=10.0     Peak A                    1.63  m/sec    ---------  E/e', lat john, TDI    (N) 13           <=13       Decel time                180   ms       ---------  E', med john, TDI      (N) 9.0   cm/sec >=7.0      PHT                       53    ms       ---------  E/e', med john, TDI        12           ---------  Peak grad, D              5     mm Hg    ---------  E', avg, TDI              8.705 cm/sec ---------  Peak E/A ratio            0.7            ---------  E/e', avg, TDI        (N) 13           <=14       MVA, PHT                  3.2   cm^2     ---------  MVA/bsa, PHT              1.75  cm^2/m^2 ---------  LVOT                      Value        Ref  Diam, S                   1.9   cm     ---------  Pulmonic valve            Value          Ref  Area                      2.8   cm^2   ---------  Peak v, S                 2.1   m/sec    ---------  Peak samuel, S                1.51  m/sec  ---------  Mean samuel, S               1.29  m/sec    ---------  Peak grad, S              9     mm Hg  ---------  Mean grad, S              9     mm Hg    ---------  Peak grad, S              18    mm Hg    ---------  Right ventricle           Value        Ref  JUAN C, LAX                  2.9   cm     ---------  Aortic root               Value          Ref  Root diam, ED         (N) 3.1   cm       2.6 - 4.0  RVOT                      Value        Ref  Peak v, S                 1.62  m/sec  ---------  Ascending aorta           Value          Ref  Peak grad, S              10    mm Hg  ---------  AAo AP diam, ED       (N) 2.8   cm       1.9 - 3.5  AAo AP diam/bsa, ED   (N) 1.5   cm/m^2   1.0 - 2.2  Left atrium               Value        Ref  AP dim, ES            (N) 2.9   cm     2.7 - 3.8  Systemic veins            Value          Ref  AP dim index, ES      (N) 1.6   cm/m^2 1.5 - 2.3  Estimated CVP             3     mm Hg    ---------  Legend:  (L)  and  (H)  robert values outside specified reference range.    (N)  marks values inside specified reference range.    Prepared and electronically signed by  Jose Ramirez MD  12/15/2023 12:39       Stress test:  No results found for this or any previous visit.       Cardiac: Cath Report:    No results found for this or any previous visit.     Cardiac:  ECG:   Encounter Date: 12/15/23   Electrocardiogram 12-Lead   Result Value    Ventricular Rate EKG/Min (BPM) 112    Atrial Rate (BPM) 112    AZ-Interval (MSEC) 142    QRS-Interval (MSEC) 88    QT-Interval (MSEC) 342    QTc 467    P Axis (Degrees) 53    R Axis (Degrees) 22    T Axis (Degrees) 88    REPORT TEXT      Sinus tachycardia  with  premature atrial complexes  Possible  Anterior infarct  (cited on or before  14-DEC-2023)  Abnormal ECG  When compared with ECG of  15-DEC-2023 04:04,  premature atrial complexes  are now  present          Pathology Report  No results found for: \"PATHREPORT\"     Test Results  Pending   Pending Labs       Order Current Status    Urine, Bacterial Culture Preliminary result              I personally reviewed the patient's imaging, radiology and report(s).    Assessment & Plan      Patient Active Problem List   Diagnosis    Ischemic stroke (CMD)    Stroke due to embolism of cerebral artery (CMD)        Shawna Mc is a 64 year old female admitted on 12/15/2023 12:21 AM for #  #This is a 64 year old female with PMH of HTN, hypothyroidism, morbid obesity was admitted with a left MCA occlusion now status post TNK and thrombectomy.  Patient has been evaluated by neurology team.  CT scan showed large left MCA stroke with midline shift.  Patient is slightly improving currently on NG feeding aphasic unable to move right upper and lower EXTR she was having difficulty breathing for last few days but now improving she was on high flow oxygen but has improved.  Patient also has been seen by PMR service and they are waiting for her improvement in respiratory status before transferring to 6 S.  Patient receiving aspirin statins and start her on Plavix.  Stroke neurologist I will continue to follow her.  I spoke to the family by side her  and the son and they wanted to know when his he will be moving out of neuro ICU and then to rehab facility.    -----------------------------------------------------------    # All the following conditions PRESENT ON ADMISSION.    # Left MCA infarct with the right-sided weakness and aphasia  s/p TNK + thrombectomy with TICI 2C recanalization   On the service following patient  Rehab has followed and recommend acute rehab once he is a stable  Continue aspirin 325 for stroke prevention  Atorvastatin 80 mg  Hypercoagulable workup will be needed as per neurology but currently see not stable to go [facility]  Echocardiogram with a EF of 70% mild to moderate AAS mild MR no atrial shunt  EKG sinus rhythm with PAC  Cardiology recommend Holter at the discharge    # Type II  diabetic with a hemoglobin A1c 6.9  On sliding scale    # Hyperlipidemia statins    # HTN  Keep blood pressure below 160  Amlodipine 10 mg plus lisinopril 10 mg daily    # Hypothyroidism continue replacement therapy    # Acute respiratory failure with tachypnea requiring high flow oxygen  On diuretic therapy as needed  Monitor aspiration  Is slowly improving    #Imaging: personally reviewed    # Labs: personally reviewed.    # Replete electrolytes as needed  Maintain potassium above 4 and magnesium above 2    # DVT prophylaxis:   Current Active Medications for DVT Prophylaxis (From admission, onward)           Stop     heparin (porcine) injection 5,000 Units  5,000 Units,   Subcutaneous,   3 times per day         --                    # Diet: Tube Feeding Diet Glucerna 1.2 Calorie/Diabetic/Glucose Intolerance 1.2 Calorie Formula; Without Diet Tray; Water; Every 4 Hours; 60; Hold Tube Feeds One Hour Before and One Hour After Administering Synthroid. Can Resume Feeds After Hold at Same R...     # Code status:   Code Status: Full Resuscitation    #Baseline Activity:    # Vaccination: status up to date.    # Condition:  stable    # Tamra Madrigal MD   informed by Message Routing system of Epic      #Disposition:  # Patient has been seen by neurointensivist team recommending transfer to the floor  PT OT speech therapy pending  PMR have seen the patient and waiting for improvement before transferring to acute rehab      Inpatient Medications Reviewed:  Allergies  ALLERGIES:  Patient has no known allergies.    Regular Medications  Current Facility-Administered Medications   Medication Dose Route Frequency Provider Last Rate Last Admin    polyethylene glycol (MIRALAX) packet 17 g  17 g Per NG Tube BID Sinai Black T, DO        docusate sodium-sennosides (SENOKOT S) 50-8.6 MG 2 tablet  2 tablet Per NG Tube BID Bryan Chaney MD   2 tablet at 12/20/23 0804    lisinopril (ZESTRIL) tablet 20 mg  20 mg Per NG Tube  Daily Pam, Jennifer, NP   20 mg at 12/20/23 0804    insulin glargine (LANTUS) injection 15 Units  15 Units Subcutaneous Daily Jennifer Orozco NP   15 Units at 12/20/23 0831    ipratropium-albuterol (DUONEB) 0.5-2.5 (3) MG/3ML nebulizer solution 3 mL  3 mL Nebulization Q6H Resp Jennifer Orozco NP   3 mL at 12/20/23 0847    cefTRIAXone (ROCEPHIN) syringe 1,000 mg  1,000 mg Intravenous Nightly Giovanni Medina MD   1,000 mg at 12/19/23 2154    insulin lispro (ADMELOG,HumaLOG) - Correction Dose   Subcutaneous 4 times per day Praveen Barlow CNS   3 Units at 12/20/23 0530    amantadine (SYMMETREL) 50 MG/5ML solution 100 mg  100 mg Per NG Tube BID Giovanni Medina MD   100 mg at 12/20/23 1121    modafinil (PROVIGIL) tablet 200 mg  200 mg Per NG Tube Daily Giovanni Medina MD   200 mg at 12/20/23 0522    sodium chloride 0.9 % injection 2 mL  2 mL Intracatheter 2 times per day Jennifer Orozco NP   2 mL at 12/20/23 0806    Potassium Standard Replacement Protocol (Levels 3.5 and lower)   Does not apply See Admin Instructions Jennifer Orozco NP        Potassium Replacement (Levels 3.6 - 4)   Does not apply See Admin Instructions Jennifer Orozco NP        Magnesium Standard Replacement Protocol   Does not apply See Admin Instructions Jennifer Orozco NP        Phosphorus Standard Replacement Protocol   Does not apply See Admin Instructions Jennifer Orozco NP        sodium chloride 0.9 % injection 2 mL  2 mL Intracatheter 2 times per day Lillie Faust MD   2 mL at 12/20/23 0805    amLODIPine (NORVASC) tablet 10 mg  10 mg Per NG Tube Daily Capo Archibald MD   10 mg at 12/20/23 0804    levothyroxine (SYNTHROID, LEVOTHROID) tablet 50 mcg  50 mcg Per NG Tube QAM AC Capo Archibald MD   50 mcg at 12/20/23 0522    atorvastatin (LIPITOR) tablet 40 mg  40 mg Per NG Tube Nightly Capo Archibald MD   40 mg at 12/19/23 2154    aspirin tablet 325 mg  325 mg Per NG Tube Daily Praveen Barlow CNS   325 mg at  12/20/23 0804    heparin (porcine) injection 5,000 Units  5,000 Units Subcutaneous 3 times per day Praveen Barlow, CNS   5,000 Units at 12/20/23 0522          IV-Infusions  Current Facility-Administered Medications   Medication Dose Route Frequency Provider Last Rate Last Admin           PRN-Medications  Current Facility-Administered Medications   Medication Dose Route Frequency Provider Last Rate Last Admin    hydrALAZINE (APRESOLINE) injection 10 mg  10 mg Intravenous Q4H PRN Jennifer Orozco NP        labetalol (NORMODYNE) injection 10 mg  10 mg Intravenous Q2H PRN Jennifer Orozco NP        dextrose (GLUTOSE) 40 % gel 15 g  15 g Per NG Tube PRN Jennifer Orozco NP        dextrose (GLUTOSE) 40 % gel 30 g  30 g Per NG Tube PRN Jennifer Orozco NP        magnesium hydroxide (MILK OF MAGNESIA) 400 MG/5ML suspension 30 mL  30 mL Per NG Tube Daily PRN Jennifer Orozco NP   30 mL at 12/20/23 0909    acetaminophen (TYLENOL) 160 MG/5ML solution 650 mg  650 mg Per NG Tube Q4H PRN Jennifer Orozco NP        acetaminophen (TYLENOL) tablet 650 mg  650 mg Oral Q4H PRN Jennifer Orozco NP        Or    acetaminophen (TYLENOL) suppository 650 mg  650 mg Rectal Q4H PRN Jennifer Orozco NP        sodium chloride 0.9 % flush bag 25 mL  25 mL Intravenous PRN Jennifer Orozco NP        sodium chloride 0.9 % flush bag 25 mL  25 mL Intravenous PRN Jennifer Orozco NP        ondansetron (ZOFRAN ODT) disintegrating tablet 4 mg  4 mg Oral Q12H PRN Jennifer Orozco NP        Or    ondansetron (ZOFRAN) injection 4 mg  4 mg Intravenous Q12H PRN Jennifer Orozco NP        bisacodyl (DULCOLAX) suppository 10 mg  10 mg Rectal Daily PRN Jennifer Orozco NP        sodium chloride 0.9 % flush bag 25 mL  25 mL Intravenous PRN Lillie Faust MD        dextrose 50 % injection 25 g  25 g Intravenous PRN Cpao Archibald MD        dextrose 50 % injection 12.5 g  12.5 g Intravenous PRN Capo Archibald MD        glucagon (GLUCAGEN) injection 1 mg  1 mg Intramuscular PRN  Capo Archibald MD            CONSULTS  IP Consult Orders (From admission, onward)       Start     Ordered    12/20/23 1126  Inpatient consult to Physical Medicine Rehab  ONE TIME        Provider:  Judy Figueredo MD    12/20/23 1125    12/20/23 1125  Inpatient consult to Hospitalist  ONE TIME        Provider:  Karri Baltazar MD    12/20/23 1125    12/19/23 1024  Inpatient consult to Cardiology  ONE TIME        Provider:  Isidra Kramer MD    12/19/23 1028    12/15/23 0147  Inpatient consult to Neurology  ONE TIME        Provider:  Vania Sanchez MD    12/15/23 0146    12/15/23 0147  Inpatient consult to Physical Medicine & Rehab  ONE TIME        Provider:  (Not yet assigned)    12/15/23 0146    12/15/23 0125  Inpatient consult to Neurosurgery  ONE TIME        Provider:  Lillie Faust MD    12/15/23 0124                         #Total time spent was more than 75 mins. on this patient's care today. This includes the following: Reviewed all vitals, medications, new orders, I/O, labs, micro, radiology, nurses notes, pertinent consultant notes which are reflected in assessment and plan.This does not include time spent on other items of care such as smoking cessation counseling, prolonged care time, and or advanced care planning if applicable.     Karri Baltazar MD  [facility]   12/20/2023

## 2024-10-18 NOTE — PSYCHIATRIC REHAB INITIAL EVALUATION - LIVING ENVIRONMENT
Last OV 9/24/24     Next OV not scheduled    Requesting refill on donepezil thru [facility]   Rx pending  
no
